# Patient Record
Sex: FEMALE | Race: WHITE | NOT HISPANIC OR LATINO | Employment: FULL TIME
[De-identification: names, ages, dates, MRNs, and addresses within clinical notes are randomized per-mention and may not be internally consistent; named-entity substitution may affect disease eponyms.]

---

## 2017-08-05 ENCOUNTER — HEALTH MAINTENANCE LETTER (OUTPATIENT)
Age: 37
End: 2017-08-05

## 2019-11-05 ENCOUNTER — HEALTH MAINTENANCE LETTER (OUTPATIENT)
Age: 39
End: 2019-11-05

## 2019-11-19 ENCOUNTER — OFFICE VISIT (OUTPATIENT)
Dept: FAMILY MEDICINE | Facility: CLINIC | Age: 39
End: 2019-11-19
Payer: COMMERCIAL

## 2019-11-19 VITALS
OXYGEN SATURATION: 97 % | BODY MASS INDEX: 38.22 KG/M2 | SYSTOLIC BLOOD PRESSURE: 144 MMHG | TEMPERATURE: 97.9 F | DIASTOLIC BLOOD PRESSURE: 86 MMHG | RESPIRATION RATE: 20 BRPM | HEART RATE: 100 BPM | HEIGHT: 70 IN | WEIGHT: 267 LBS

## 2019-11-19 DIAGNOSIS — I10 ESSENTIAL HYPERTENSION: Primary | ICD-10-CM

## 2019-11-19 PROBLEM — K90.41 GLUTEN INTOLERANCE: Status: ACTIVE | Noted: 2019-11-19

## 2019-11-19 PROBLEM — R73.03 PREDIABETES: Status: ACTIVE | Noted: 2018-12-10

## 2019-11-19 PROBLEM — J30.1 SEASONAL ALLERGIC RHINITIS DUE TO POLLEN: Status: ACTIVE | Noted: 2019-11-19

## 2019-11-19 PROCEDURE — 99203 OFFICE O/P NEW LOW 30 MIN: CPT | Performed by: NURSE PRACTITIONER

## 2019-11-19 RX ORDER — HYDROCHLOROTHIAZIDE 25 MG/1
25 TABLET ORAL DAILY
COMMUNITY
Start: 2010-09-04 | End: 2019-11-19

## 2019-11-19 RX ORDER — CHLORTHALIDONE 25 MG/1
25 TABLET ORAL DAILY
Qty: 30 TABLET | Refills: 1 | Status: SHIPPED | OUTPATIENT
Start: 2019-11-19 | End: 2020-01-02

## 2019-11-19 ASSESSMENT — MIFFLIN-ST. JEOR: SCORE: 1965.32

## 2019-11-19 ASSESSMENT — PAIN SCALES - GENERAL: PAINLEVEL: NO PAIN (0)

## 2019-11-19 NOTE — PROGRESS NOTES
Subjective     Madyson Downing is a 39 year old female who presents to clinic today for the following health issues:    HPI   Chief Complaint   Patient presents with     Medication Request     needs to change BP meds     Patient is here to discuss blood pressure medications.  She is currently on hydrochlorothiazide without issues.  She had anaphylaxis with amlodipine recently and severe GI upset with losartan.  Patient had intestinal angioedema with lisinopril in the past.  She is working on diet, exercise and weight loss to help control her blood pressure.  Blood pressure at home has been similar to blood pressure today in clinic.          Patient Active Problem List   Diagnosis     Contraceptive management     CARDIOVASCULAR SCREENING; LDL GOAL LESS THAN 160     ADHD, predominantly inattentive type     Hashimoto's thyroiditis     Essential hypertension     Prediabetes     Sleep apnea     Seasonal allergic rhinitis due to pollen     Gluten intolerance     Past Surgical History:   Procedure Laterality Date     C ANESTH, SECTION      x2     EYE SURGERY Right     Strabismus correction     HC REMOVAL GALLBLADDER       SURGICAL HISTORY OF -       eye surgery for lazy eye       Social History     Tobacco Use     Smoking status: Never Smoker     Smokeless tobacco: Never Used   Substance Use Topics     Alcohol use: Yes     Comment: Socially     Family History   Problem Relation Age of Onset     Lipids Father      Diabetes Father         Type 2     Cerebrovascular Disease Mother      C.A.D. Mother      Diabetes Sister         Type 2     Diabetes Sister         Type 2         Current Outpatient Medications   Medication Sig Dispense Refill     beclomethasone (QNASL) 80 MCG/ACT nasal aerosol INHALE 1 PUFF PER NOSTRIL BID       chlorthalidone (HYGROTON) 25 MG tablet Take 1 tablet (25 mg) by mouth daily 30 tablet 1     LEVOTHYROXINE SODIUM 88 MCG OR TABS 1 TABLET DAILY       MULTIPLE VITAMINS-CALCIUM PO Take 1  "tablet by mouth every 24 hours       Allergies   Allergen Reactions     Amlodipine Anaphylaxis     Food      PN: LW Other1: -Gluten Sensitivity     Gluten Meal Nausea and Vomiting     Losartan Nausea and GI Disturbance     BP Readings from Last 3 Encounters:   11/19/19 (!) 144/86   08/13/09 108/84    Wt Readings from Last 3 Encounters:   11/19/19 121.1 kg (267 lb)   08/13/09 116.1 kg (256 lb)                    Reviewed and updated as needed this visit by Provider  Tobacco  Allergies  Meds  Problems  Med Hx  Surg Hx  Fam Hx         Review of Systems   ROS COMP: Constitutional, HEENT, cardiovascular, pulmonary, gi and gu systems are negative, except as otherwise noted.      Objective    BP (!) 144/86   Pulse 100   Temp 97.9  F (36.6  C) (Oral)   Resp 20   Ht 1.784 m (5' 10.25\")   Wt 121.1 kg (267 lb)   LMP 11/18/2019   SpO2 97%   BMI 38.04 kg/m    Body mass index is 38.04 kg/m .  Physical Exam   GENERAL: healthy, alert and no distress  RESP: lungs clear to auscultation - no rales, rhonchi or wheezes  CV: regular rate and rhythm, normal S1 S2, no S3 or S4, no murmur, click or rub, no peripheral edema and peripheral pulses strong  MS: no gross musculoskeletal defects noted, no edema    Diagnostic Test Results:  none         Assessment & Plan     1. Essential hypertension  Patient to stop hydrochlorothiazide and will try chlorthalidone, which may better control her blood pressure than hydrochlorothiazide .  Consider adding a beta blocker if blood pressure is not controlled.  - chlorthalidone (HYGROTON) 25 MG tablet; Take 1 tablet (25 mg) by mouth daily  Dispense: 30 tablet; Refill: 1               Return in about 2 weeks (around 12/3/2019) for BP Recheck, labs.    [facility]Y [facility]ios [facility]      "

## 2019-12-03 ENCOUNTER — ALLIED HEALTH/NURSE VISIT (OUTPATIENT)
Dept: FAMILY MEDICINE | Facility: CLINIC | Age: 39
End: 2019-12-03

## 2019-12-03 VITALS — DIASTOLIC BLOOD PRESSURE: 84 MMHG | SYSTOLIC BLOOD PRESSURE: 136 MMHG

## 2019-12-03 DIAGNOSIS — Z01.30 BP CHECK: Primary | ICD-10-CM

## 2019-12-03 PROCEDURE — 99207 ZZC NO CHARGE NURSE ONLY: CPT | Performed by: NURSE PRACTITIONER

## 2019-12-03 NOTE — PROGRESS NOTES
Madyson Downing was evaluated at [facility] on December 3, 2019 at which time her blood pressure was:    BP Readings from Last 3 Encounters:   12/03/19 136/84   11/19/19 (!) 144/86   08/13/09 108/84     Pulse Readings from Last 3 Encounters:   11/19/19 100   08/13/09 80       Reviewed lifestyle modifications for blood pressure control and reduction: including making healthy food choices, managing weight, getting regular exercise, smoking cessation, reducing alcohol consumption, monitoring blood pressure regularly.     Symptoms: None    BP Goal:< 140/90 mmHg    BP Assessment:  BP at goal    Potential Reasons for BP too high: NA - Not applicable    BP Follow-Up Plan: Recheck BP in 6 months at pharmacy    Recommendation to Provider: Continue with new medication.  Patient said she has felt very good on that.     Note completed by: Thank you,  Neeta Ramos, PharmD  [facility]  [phone]

## 2020-01-02 ENCOUNTER — OFFICE VISIT (OUTPATIENT)
Dept: FAMILY MEDICINE | Facility: CLINIC | Age: 40
End: 2020-01-02
Payer: COMMERCIAL

## 2020-01-02 VITALS
BODY MASS INDEX: 38.51 KG/M2 | WEIGHT: 269 LBS | RESPIRATION RATE: 18 BRPM | HEIGHT: 70 IN | DIASTOLIC BLOOD PRESSURE: 86 MMHG | OXYGEN SATURATION: 99 % | TEMPERATURE: 98.9 F | HEART RATE: 99 BPM | SYSTOLIC BLOOD PRESSURE: 128 MMHG

## 2020-01-02 DIAGNOSIS — J30.1 SEASONAL ALLERGIC RHINITIS DUE TO POLLEN: Primary | ICD-10-CM

## 2020-01-02 DIAGNOSIS — I10 ESSENTIAL HYPERTENSION: ICD-10-CM

## 2020-01-02 LAB
ANION GAP SERPL CALCULATED.3IONS-SCNC: 7 MMOL/L (ref 3–14)
BUN SERPL-MCNC: 15 MG/DL (ref 7–30)
CALCIUM SERPL-MCNC: 9.6 MG/DL (ref 8.5–10.1)
CHLORIDE SERPL-SCNC: 101 MMOL/L (ref 94–109)
CO2 SERPL-SCNC: 28 MMOL/L (ref 20–32)
CREAT SERPL-MCNC: 0.53 MG/DL (ref 0.52–1.04)
GFR SERPL CREATININE-BSD FRML MDRD: >90 ML/MIN/{1.73_M2}
GLUCOSE SERPL-MCNC: 109 MG/DL (ref 70–99)
POTASSIUM SERPL-SCNC: 3.8 MMOL/L (ref 3.4–5.3)
SODIUM SERPL-SCNC: 136 MMOL/L (ref 133–144)

## 2020-01-02 PROCEDURE — 80048 BASIC METABOLIC PNL TOTAL CA: CPT | Performed by: NURSE PRACTITIONER

## 2020-01-02 PROCEDURE — 99213 OFFICE O/P EST LOW 20 MIN: CPT | Performed by: NURSE PRACTITIONER

## 2020-01-02 PROCEDURE — 36415 COLL VENOUS BLD VENIPUNCTURE: CPT | Performed by: NURSE PRACTITIONER

## 2020-01-02 RX ORDER — CHLORTHALIDONE 25 MG/1
25 TABLET ORAL DAILY
Qty: 90 TABLET | Refills: 3 | Status: SHIPPED | OUTPATIENT
Start: 2020-01-02 | End: 2021-01-14

## 2020-01-02 ASSESSMENT — PAIN SCALES - GENERAL: PAINLEVEL: NO PAIN (0)

## 2020-01-02 ASSESSMENT — MIFFLIN-ST. JEOR: SCORE: 1974.4

## 2020-01-02 NOTE — PROGRESS NOTES
Subjective     Madyson Downing is a 39 year old female who presents to clinic today for the following health issues:    HPI   Medication Followup of Chlorthalidone 25 mg    Taking Medication as prescribed: yes    Side Effects:  None    Medication Helping Symptoms:  yes     Patient feels good on chlorthalidone.  She denies any side effects and blood pressure has been controlled.    Patient requests refill of QNasl, as her allergist has retired.  She has chronically used medication twice daily.  She requests referral to new allergist.        Patient Active Problem List   Diagnosis     Contraceptive management     CARDIOVASCULAR SCREENING; LDL GOAL LESS THAN 160     ADHD, predominantly inattentive type     Hashimoto's thyroiditis     Essential hypertension     Prediabetes     Sleep apnea     Seasonal allergic rhinitis due to pollen     Gluten intolerance     Past Surgical History:   Procedure Laterality Date     C ANESTH, SECTION      x2     EYE SURGERY Right     Strabismus correction     HC REMOVAL GALLBLADDER       SURGICAL HISTORY OF -       eye surgery for lazy eye       Social History     Tobacco Use     Smoking status: Never Smoker     Smokeless tobacco: Never Used   Substance Use Topics     Alcohol use: Yes     Comment: Socially     Family History   Problem Relation Age of Onset     Lipids Father      Diabetes Father         Type 2     Cerebrovascular Disease Mother      C.A.D. Mother      Diabetes Sister         Type 2     Diabetes Sister         Type 2         Current Outpatient Medications   Medication Sig Dispense Refill     beclomethasone (QNASL) 80 MCG/ACT nasal aerosol Spray 1 spray into both nostrils 2 times daily 10.6 g 11     chlorthalidone (HYGROTON) 25 MG tablet Take 1 tablet (25 mg) by mouth daily 90 tablet 3     LEVOTHYROXINE SODIUM 88 MCG OR TABS 1 TABLET DAILY       MULTIPLE VITAMINS-CALCIUM PO Take 1 tablet by mouth every 24 hours       Allergies   Allergen Reactions     Amlodipine  "Anaphylaxis     Food      PN: LW Other1: -Gluten Sensitivity     Gluten Meal Nausea and Vomiting     Losartan Nausea and GI Disturbance     BP Readings from Last 3 Encounters:   01/02/20 128/86   12/03/19 136/84   11/19/19 (!) 144/86    Wt Readings from Last 3 Encounters:   01/02/20 122 kg (269 lb)   11/19/19 121.1 kg (267 lb)   08/13/09 116.1 kg (256 lb)                    Reviewed and updated as needed this visit by Provider  Tobacco  Allergies  Meds  Problems  Med Hx  Surg Hx  Fam Hx         Review of Systems   ROS COMP: Constitutional, HEENT, cardiovascular, pulmonary, gi and gu systems are negative, except as otherwise noted.      Objective    /86   Pulse 99   Temp 98.9  F (37.2  C) (Oral)   Resp 18   Ht 1.784 m (5' 10.25\")   Wt 122 kg (269 lb)   LMP 12/09/2019   SpO2 99%   BMI 38.32 kg/m    Body mass index is 38.32 kg/m .  Physical Exam   GENERAL: healthy, alert and no distress  RESP: lungs clear to auscultation - no rales, rhonchi or wheezes  CV: regular rate and rhythm, normal S1 S2, no S3 or S4, no murmur, click or rub, no peripheral edema and peripheral pulses strong  MS: no gross musculoskeletal defects noted, no edema    Diagnostic Test Results:  In process        Assessment & Plan     1. Seasonal allergic rhinitis due to pollen    - ALLERGY/ASTHMA ADULT REFERRAL  - beclomethasone (QNASL) 80 MCG/ACT nasal aerosol; Spray 1 spray into both nostrils 2 times daily  Dispense: 10.6 g; Refill: 11    2. Essential hypertension  Stable.  Continue current treatment plan and medications.   - chlorthalidone (HYGROTON) 25 MG tablet; Take 1 tablet (25 mg) by mouth daily  Dispense: 90 tablet; Refill: 3  - Basic metabolic panel           Return in about 10 months (around 10/24/2020) for Physical Exam.    NATTY Rios [facility]    "

## 2020-01-03 NOTE — RESULT ENCOUNTER NOTE
Dear Madyson,    Your recent test results are attached.      Normal kidney function and electrolytes.      If you have any questions please feel free to contact [phone] or myself via [employer].    Sincerely,  Shell Cornejo, CNP

## 2020-10-27 ENCOUNTER — VIRTUAL VISIT (OUTPATIENT)
Dept: FAMILY MEDICINE | Facility: CLINIC | Age: 40
End: 2020-10-27
Payer: COMMERCIAL

## 2020-10-27 DIAGNOSIS — B34.9 VIRAL ILLNESS: ICD-10-CM

## 2020-10-27 DIAGNOSIS — L01.00 IMPETIGO: Primary | ICD-10-CM

## 2020-10-27 PROCEDURE — 99213 OFFICE O/P EST LOW 20 MIN: CPT | Mod: TEL | Performed by: NURSE PRACTITIONER

## 2020-10-27 RX ORDER — DOXYCYCLINE 100 MG/1
100 CAPSULE ORAL 2 TIMES DAILY
Qty: 14 CAPSULE | Refills: 0 | Status: SHIPPED | OUTPATIENT
Start: 2020-10-27 | End: 2020-11-03

## 2020-10-27 NOTE — PROGRESS NOTES
"Madyson Downing is a 40 year old female who is being evaluated via a billable telephone visit.      The patient has been notified of following:     \"This telephone visit will be conducted via a call between you and your physician/provider. We have found that certain health care needs can be provided without the need for a physical exam.  This service lets us provide the care you need with a short phone conversation.  If a prescription is necessary we can send it directly to your pharmacy.  If lab work is needed we can place an order for that and you can then stop by our lab to have the test done at a later time.    Telephone visits are billed at different rates depending on your insurance coverage. During this emergency period, for some insurers they may be billed the same as an in-person visit.  Please reach out to your insurance provider with any questions.    If during the course of the call the physician/provider feels a telephone visit is not appropriate, you will not be charged for this service.\"    Patient has given verbal consent for Telephone visit?  Yes    What phone number would you like to be contacted at? [phone]    How would you like to obtain your AVS? MyChart    Subjective     Madyson Downing is a 40 year old female who presents via phone visit today for the following health issues:    HPI     Concern - Sores on Scalp.  Onset: 6 months ago  Description: back of head  Intensity: mild  Progression of Symptoms:  improving  Accompanying Signs & Symptoms: itchy worse in the evening.   Precipitating factors:        Worsened by: Dandruff shampoo made it more itchy.   Alleviating factors:        Improved by:  Cephalexin, Ketoconazole, Mupirocin   Therapies tried and outcome: Cephalexin, Ketoconazole, Mupirocin have all helped but still have sores.     Patient notes that she has had Covid-19 symptoms of cough, mild shortness of breath for the past 5 days.  She tested negative on 10/25, but continues to have " symptoms.  Both of her children have tested positive for Covid-19.  She wonders if she should be retested.             Review of Systems   Constitutional, HEENT, cardiovascular, pulmonary, gi and gu systems are negative, except as otherwise noted.       Objective          Vitals:  No vitals were obtained today due to virtual visit.    healthy, alert and no distress  PSYCH: Alert and oriented times 3; coherent speech, normal   rate and volume, able to articulate logical thoughts, able   to abstract reason, no tangential thoughts, no hallucinations   or delusions  Her affect is normal  RESP: No cough, no audible wheezing, able to talk in full sentences  Remainder of exam unable to be completed due to telephone visits            Assessment/Plan:    Assessment & Plan     Impetigo  Will try treating for MRSA.  If no improvement, patient to follow-up with dermatology.  - DERMATOLOGY ADULT REFERRAL; Future  - doxycycline hyclate (VIBRAMYCIN) 100 MG capsule; Take 1 capsule (100 mg) by mouth 2 times daily for 7 days    Viral illness  Patient to retest for Covid-19.  I have strong suspicion for false negative on previous test.  - Symptomatic COVID-19 Virus (Coronavirus) by PCR; Future                Return in about 3 months (around 1/27/2021) for Physical Exam.    NATTY Rios [facility]    Phone call duration:  9 minutes

## 2020-10-29 DIAGNOSIS — B34.9 VIRAL ILLNESS: ICD-10-CM

## 2020-10-29 PROCEDURE — U0003 INFECTIOUS AGENT DETECTION BY NUCLEIC ACID (DNA OR RNA); SEVERE ACUTE RESPIRATORY SYNDROME CORONAVIRUS 2 (SARS-COV-2) (CORONAVIRUS DISEASE [COVID-19]), AMPLIFIED PROBE TECHNIQUE, MAKING USE OF HIGH THROUGHPUT TECHNOLOGIES AS DESCRIBED BY CMS-2020-01-R: HCPCS | Performed by: NURSE PRACTITIONER

## 2020-10-30 LAB
SARS-COV-2 RNA SPEC QL NAA+PROBE: NOT DETECTED
SPECIMEN SOURCE: NORMAL

## 2020-11-02 ENCOUNTER — VIRTUAL VISIT (OUTPATIENT)
Dept: FAMILY MEDICINE | Facility: OTHER | Age: 40
End: 2020-11-02

## 2020-11-02 ENCOUNTER — TRANSFERRED RECORDS (OUTPATIENT)
Dept: HEALTH INFORMATION MANAGEMENT | Facility: CLINIC | Age: 40
End: 2020-11-02

## 2020-11-02 NOTE — RESULT ENCOUNTER NOTE
Dear Madyson,    Your recent test results are attached.      No Covid-19 detected.    If you have any questions please feel free to contact [phone] or myself via [employer].    Sincerely,  Shell Cornejo, CNP

## 2020-11-02 NOTE — PROGRESS NOTES
"Date: 2020 08:16:35  Clinician: Jessenia Penny  Clinician NPI: [id number]  Patient: Madyson Downing  Patient : 1980  Patient Address: [address] [place] [address]  Patient Phone: [phone]  Visit Protocol: URI  Patient Summary:  Madyson is a 40 year old ( : 1980 ) female who initiated a [facility] Visit for COVID-19 (Coronavirus) evaluation and screening. When asked the question \"Please sign me up to receive news, health information and promotions from [facility].\", Madyson responded \"Yes\".    Madyson states her symptoms started suddenly 10-13 days ago. After her symptoms started, they improved and then got worse again.   Her symptoms consist of a cough, myalgia, malaise, ageusia, diarrhea, and rhinitis.   Symptom details     Nasal secretions: The color of her mucus is white.    Cough: Madyson coughs a few times an hour and her cough is not more bothersome at night. Phlegm does not come into her throat when she coughs. She does not believe her cough is caused by post-nasal drip.      Madyson denies having ear pain, headache, wheezing, fever, nasal congestion, nausea, facial pain or pressure, chills, sore throat, teeth pain, anosmia, and vomiting. She also denies having recent facial or sinus surgery in the past 60 days. She is not experiencing dyspnea.   Precipitating events  She has not recently been exposed to someone with influenza. Madyson has been in close contact with the following high risk individuals: people with asthma, heart disease or diabetes.   Pertinent COVID-19 (Coronavirus) information  Madyson does not work or volunteer as healthcare worker or a . In the past 14 days, Madyson has not worked or volunteered at a healthcare facility or group living setting.   In the past 14 days, she also has not lived in a congregate living setting.   Madyson has had a close contact with a laboratory-confirmed COVID-19 patient within 14 days of symptom onset. She was not exposed at " her work. Date Madyson was exposed to the laboratory-confirmed COVID-19 patient: 10/20/2020   Additional information about contact with COVID-19 (Coronavirus) patient as reported by the patient (free text): Both my children tested positive for COVID-19, one on 10/19 and the other 10/24.  I tested negative on 10/24 and 10/29.  However, I wanted to connect with someone regarding intermittent chest pain that is worse at night when I am lying down in bed.    Since December 2019, Madyson has not been diagnosed with lab-confirmed COVID-19 test and has had upper respiratory infection (URI) or influenza-like illness.      Date(s) of previous URI or influenza-like illness (free-text): 10/22/2020 through the present     Symptoms Madyson experienced during previous URI or influenza-like illness as reported by the patient (free-text): Dry cough, sore throat, muscle aches, fatigue, dry eyes, burning face, and intermittent chest pain to the left of my sternum that's worse at night and when I take a deep breath.        Pertinent medical history  Madyson has taken an antibiotic medication in the past month. Antibiotic details as reported by the patient (free text): Doxycycline---skin infection on scalp   Madyson does not get yeast infections when she takes antibiotics.   Madyson needs a return to work/school note.   Weight: 240 lbs   Madyson does not smoke or use smokeless tobacco.   She denies pregnancy and denies breastfeeding. She has menstruated in the past month.   Weight: 240 lbs  A synchronous phone visit was initiated by the provider for the following reason: chest pain    MEDICATIONS: doxycycline hyclate oral, chlorthalidone oral, levothyroxine oral, ALLERGIES: lisinopril, losartan, amlodipine  Clinician Response:  Dear Madyson,  I am sorry you are not feeling well. To determine the most appropriate care for you, I would like you to be seen in person to further discuss your health history and symptoms.  You will not be charged for  this [facility] Visit. Thank you for trusting us with your care.  COVID-19 (Coronavirus) General Information  Because there is currently no vaccine to prevent infection, the best way to protect yourself is to avoid being exposed to this virus. Common symptoms of COVID-19 include but are not limited to fever, cough, and shortness of breath. These symptoms appear 2-14 days after you are exposed to the virus that causes COVID-19. Click here for more information from the CDC on how to protect yourself.  If you are sick with COVID-19 or suspect you are infected with the virus that causes COVID-19, follow the steps here from the CDC to help prevent the disease from spreading to people in your home and community.  Click here for general information from the CDC on testing.  If you develop any of these emergency warning signs for COVID-19, get medical attention immediately:     Trouble breathing    Persistent pain or pressure in the chest    New confusion or inability to arouse    Bluish lips or face      Call your doctor or clinic before going in. Call 911 if you have a medical emergency and notify the  you have or think you may have COVID-19.  For more detailed and up to date information on COVID-19 (Coronavirus), please visit the CDC website.   Diagnosis: Refer for additional evaluation  Diagnosis ICD: R69  Triage Notes: I reviewed the patient's history, verified their identity, and explained the [facility] Visit process.    Chest pain for 3 days  Synchronous Triage: phone, status: completed, duration: 148 seconds

## 2020-11-12 NOTE — PROGRESS NOTES
"Subjective     Madyson Downing is a 40 year old female who presents to clinic today for the following health issues:    HPI         ED/UC Followup:    Facility:  [facility]   Date of visit: 11/02/2020  Reason for visit: chest pain and burning in chest  Current Status: doing much better     Patient took Pepcid AC with resolution of symptoms.  Patient notes mild cough following infection.  She denies any further chest pain.  She feels that she is near baseline at this time and would like to return to exercise.  She is concerned that  provider noted EKG was abnormal, but let her go due to symptoms improving with antacids.  Notes state that presentation was not normal for ACS.  Patient notes that she had been exercising regularly prior to illness without chest pain, significant shortness of breath.        Review of Systems   Constitutional, HEENT, cardiovascular, pulmonary, gi and gu systems are negative, except as otherwise noted.      Objective    /66   Pulse 72   Temp 97.6  F (36.4  C) (Oral)   Resp 20   Ht 1.784 m (5' 10.25\")   Wt 110 kg (242 lb 9.6 oz)   SpO2 99%   BMI 34.56 kg/m    Body mass index is 34.56 kg/m .  Physical Exam   GENERAL: healthy, alert and no distress  RESP: lungs clear to auscultation - no rales, rhonchi or wheezes  CV: regular rate and rhythm, normal S1 S2, no S3 or S4, no murmur, click or rub, no peripheral edema and peripheral pulses strong  MS: no gross musculoskeletal defects noted, no edema            Assessment & Plan     Chest pain, unspecified type  Resolved.  I will obtain EKG from [facility] and review.  Likely related to coughing, GERD.    Gastroesophageal reflux disease without esophagitis  Resolved.  Patient using Pepcid AC intermittently.                  Return in about 6 months (around 5/13/2021) for Physical Exam.    NATTY Rios CNP  [facility]    "

## 2020-11-13 ENCOUNTER — OFFICE VISIT (OUTPATIENT)
Dept: FAMILY MEDICINE | Facility: CLINIC | Age: 40
End: 2020-11-13
Payer: COMMERCIAL

## 2020-11-13 ENCOUNTER — TELEPHONE (OUTPATIENT)
Dept: INTERNAL MEDICINE | Facility: CLINIC | Age: 40
End: 2020-11-13

## 2020-11-13 VITALS
RESPIRATION RATE: 20 BRPM | SYSTOLIC BLOOD PRESSURE: 136 MMHG | HEART RATE: 72 BPM | DIASTOLIC BLOOD PRESSURE: 66 MMHG | BODY MASS INDEX: 34.73 KG/M2 | TEMPERATURE: 97.6 F | OXYGEN SATURATION: 99 % | HEIGHT: 70 IN | WEIGHT: 242.6 LBS

## 2020-11-13 DIAGNOSIS — K21.9 GASTROESOPHAGEAL REFLUX DISEASE WITHOUT ESOPHAGITIS: ICD-10-CM

## 2020-11-13 DIAGNOSIS — R07.9 CHEST PAIN, UNSPECIFIED TYPE: Primary | ICD-10-CM

## 2020-11-13 PROCEDURE — 99212 OFFICE O/P EST SF 10 MIN: CPT | Performed by: NURSE PRACTITIONER

## 2020-11-13 ASSESSMENT — MIFFLIN-ST. JEOR: SCORE: 1849.65

## 2020-11-13 NOTE — TELEPHONE ENCOUNTER
Pt called and left message on RN hotline. States she is returning Shell's call and was just in our office. No notes found. Pt can be reached at [phone]. Please advise.    Mary Toth RN  [facility]

## 2020-11-13 NOTE — TELEPHONE ENCOUNTER
Called and spoke to patient. Unable to get EKG results until patient sign MEAGHAN. Patient will come in Monday to sign MEAGHAN.  Estela Mcmillan CMA

## 2020-11-22 ENCOUNTER — HEALTH MAINTENANCE LETTER (OUTPATIENT)
Age: 40
End: 2020-11-22

## 2021-01-02 ENCOUNTER — MYC MEDICAL ADVICE (OUTPATIENT)
Dept: FAMILY MEDICINE | Facility: CLINIC | Age: 41
End: 2021-01-02

## 2021-01-02 DIAGNOSIS — E06.3 HASHIMOTO'S THYROIDITIS: Primary | ICD-10-CM

## 2021-01-05 ENCOUNTER — TELEPHONE (OUTPATIENT)
Dept: FAMILY MEDICINE | Facility: CLINIC | Age: 41
End: 2021-01-05

## 2021-01-05 DIAGNOSIS — E06.3 HASHIMOTO'S THYROIDITIS: ICD-10-CM

## 2021-01-05 RX ORDER — LEVOTHYROXINE SODIUM 88 UG/1
88 TABLET ORAL DAILY
Qty: 90 TABLET | Refills: 1 | Status: SHIPPED | OUTPATIENT
Start: 2021-01-05 | End: 2021-06-29

## 2021-01-05 NOTE — TELEPHONE ENCOUNTER
Reason for Call:  Other prescription    Detailed comments: patient states has one tablet left of Levothyroxine - 88mcg    Phone Number Patient can be reached at: Cell number on file:    Telephone Information:   Mobile [phone]       Best Time: anytime    Can we leave a detailed message on this number? YES    Call taken on 1/5/2021 at 10:13 AM by Natasha Stafford

## 2021-01-10 DIAGNOSIS — J30.1 SEASONAL ALLERGIC RHINITIS DUE TO POLLEN: ICD-10-CM

## 2021-01-11 RX ORDER — BECLOMETHASONE DIPROPIONATE 80 UG/1
AEROSOL, METERED NASAL
Qty: 10.6 G | Refills: 11 | Status: SHIPPED | OUTPATIENT
Start: 2021-01-11

## 2021-01-14 DIAGNOSIS — I10 ESSENTIAL HYPERTENSION: ICD-10-CM

## 2021-01-14 RX ORDER — CHLORTHALIDONE 25 MG/1
TABLET ORAL
Qty: 90 TABLET | Refills: 1 | Status: SHIPPED | OUTPATIENT
Start: 2021-01-14 | End: 2021-07-12

## 2021-06-29 DIAGNOSIS — E06.3 HASHIMOTO'S THYROIDITIS: ICD-10-CM

## 2021-06-29 RX ORDER — LEVOTHYROXINE SODIUM 88 UG/1
88 TABLET ORAL DAILY
Qty: 90 TABLET | Refills: 0 | Status: SHIPPED | OUTPATIENT
Start: 2021-06-29 | End: 2021-10-04

## 2021-07-10 DIAGNOSIS — I10 ESSENTIAL HYPERTENSION: ICD-10-CM

## 2021-07-12 RX ORDER — CHLORTHALIDONE 25 MG/1
25 TABLET ORAL DAILY
Qty: 90 TABLET | Refills: 0 | Status: SHIPPED | OUTPATIENT
Start: 2021-07-12 | End: 2021-10-11

## 2021-07-12 NOTE — TELEPHONE ENCOUNTER
"Routing refill request to provider for review/approval because:  Labs not current:  CR, K+, NA      Requested Prescriptions   Pending Prescriptions Disp Refills     chlorthalidone (HYGROTON) 25 MG tablet [Pharmacy Med Name: CHLORTHALIDONE 25MG TABLETS] 90 tablet 1     Sig: TAKE 1 TABLET(25 MG) BY MOUTH DAILY       Diuretics (Including Combos) Protocol Failed - 7/10/2021 10:05 AM        Failed - Normal serum creatinine on file in past 12 months     Recent Labs   Lab Test 01/02/20  1010   CR 0.53              Failed - Normal serum potassium on file in past 12 months     Recent Labs   Lab Test 01/02/20  1010   POTASSIUM 3.8                    Failed - Normal serum sodium on file in past 12 months     Recent Labs   Lab Test 01/02/20  1010                 Passed - Blood pressure under 140/90 in past 12 months     BP Readings from Last 3 Encounters:   11/13/20 136/66   01/02/20 128/86   12/03/19 136/84                 Passed - Recent (12 mo) or future (30 days) visit within the authorizing provider's specialty     Patient has had an office visit with the authorizing provider or a provider within the authorizing providers department within the previous 12 mos or has a future within next 30 days. See \"Patient Info\" tab in inbasket, or \"Choose Columns\" in Meds & Orders section of the refill encounter.              Passed - Medication is active on med list        Passed - Patient is age 18 or older        Passed - No active pregancy on record        Passed - No positive pregnancy test in past 12 months           Fatmata Castillo RN on 7/12/2021 at 2:48 PM    "

## 2021-09-19 ENCOUNTER — HEALTH MAINTENANCE LETTER (OUTPATIENT)
Age: 41
End: 2021-09-19

## 2021-09-30 DIAGNOSIS — E06.3 HASHIMOTO'S THYROIDITIS: ICD-10-CM

## 2021-10-04 RX ORDER — LEVOTHYROXINE SODIUM 88 UG/1
TABLET ORAL
Qty: 90 TABLET | Refills: 0 | Status: SHIPPED | OUTPATIENT
Start: 2021-10-04 | End: 2021-10-25

## 2021-10-04 NOTE — TELEPHONE ENCOUNTER
Pt scheduled exam for 10/25/21. Refill sent to pharmacy.    Mary Toth RN  [facility]

## 2021-10-09 DIAGNOSIS — I10 ESSENTIAL HYPERTENSION: ICD-10-CM

## 2021-10-11 RX ORDER — CHLORTHALIDONE 25 MG/1
25 TABLET ORAL DAILY
Qty: 30 TABLET | Refills: 0 | Status: SHIPPED | OUTPATIENT
Start: 2021-10-11 | End: 2021-10-25

## 2021-10-21 ASSESSMENT — ENCOUNTER SYMPTOMS
MYALGIAS: 1
ARTHRALGIAS: 1
SORE THROAT: 1
HEARTBURN: 1
COUGH: 1
BREAST MASS: 0

## 2021-10-22 ENCOUNTER — LAB (OUTPATIENT)
Dept: FAMILY MEDICINE | Facility: CLINIC | Age: 41
End: 2021-10-22
Attending: NURSE PRACTITIONER
Payer: COMMERCIAL

## 2021-10-22 DIAGNOSIS — R05.9 COUGH: ICD-10-CM

## 2021-10-22 PROCEDURE — U0003 INFECTIOUS AGENT DETECTION BY NUCLEIC ACID (DNA OR RNA); SEVERE ACUTE RESPIRATORY SYNDROME CORONAVIRUS 2 (SARS-COV-2) (CORONAVIRUS DISEASE [COVID-19]), AMPLIFIED PROBE TECHNIQUE, MAKING USE OF HIGH THROUGHPUT TECHNOLOGIES AS DESCRIBED BY CMS-2020-01-R: HCPCS

## 2021-10-22 PROCEDURE — U0005 INFEC AGEN DETEC AMPLI PROBE: HCPCS

## 2021-10-23 LAB — SARS-COV-2 RNA RESP QL NAA+PROBE: NEGATIVE

## 2021-10-25 ENCOUNTER — OFFICE VISIT (OUTPATIENT)
Dept: FAMILY MEDICINE | Facility: CLINIC | Age: 41
End: 2021-10-25
Payer: COMMERCIAL

## 2021-10-25 VITALS
BODY MASS INDEX: 33.67 KG/M2 | WEIGHT: 235.2 LBS | HEART RATE: 87 BPM | DIASTOLIC BLOOD PRESSURE: 85 MMHG | SYSTOLIC BLOOD PRESSURE: 131 MMHG | OXYGEN SATURATION: 98 % | TEMPERATURE: 97.6 F | HEIGHT: 70 IN

## 2021-10-25 DIAGNOSIS — Z12.4 SCREENING FOR CERVICAL CANCER: ICD-10-CM

## 2021-10-25 DIAGNOSIS — Z11.59 NEED FOR HEPATITIS C SCREENING TEST: ICD-10-CM

## 2021-10-25 DIAGNOSIS — R73.03 PREDIABETES: ICD-10-CM

## 2021-10-25 DIAGNOSIS — E06.3 HASHIMOTO'S THYROIDITIS: ICD-10-CM

## 2021-10-25 DIAGNOSIS — J30.1 SEASONAL ALLERGIC RHINITIS DUE TO POLLEN: ICD-10-CM

## 2021-10-25 DIAGNOSIS — Z11.4 SCREENING FOR HIV (HUMAN IMMUNODEFICIENCY VIRUS): ICD-10-CM

## 2021-10-25 DIAGNOSIS — I10 ESSENTIAL HYPERTENSION: ICD-10-CM

## 2021-10-25 DIAGNOSIS — M25.50 MULTIPLE JOINT PAIN: ICD-10-CM

## 2021-10-25 DIAGNOSIS — N89.8 VAGINAL DISCHARGE: ICD-10-CM

## 2021-10-25 DIAGNOSIS — Z00.00 ROUTINE HISTORY AND PHYSICAL EXAMINATION OF ADULT: Primary | ICD-10-CM

## 2021-10-25 LAB
BASOPHILS # BLD AUTO: 0 10E3/UL (ref 0–0.2)
BASOPHILS NFR BLD AUTO: 1 %
CLUE CELLS: ABNORMAL
EOSINOPHIL # BLD AUTO: 0.1 10E3/UL (ref 0–0.7)
EOSINOPHIL NFR BLD AUTO: 2 %
ERYTHROCYTE [DISTWIDTH] IN BLOOD BY AUTOMATED COUNT: 12 % (ref 10–15)
HBA1C MFR BLD: 5.3 % (ref 0–5.6)
HCT VFR BLD AUTO: 43.3 % (ref 35–47)
HGB BLD-MCNC: 15 G/DL (ref 11.7–15.7)
LYMPHOCYTES # BLD AUTO: 2.2 10E3/UL (ref 0.8–5.3)
LYMPHOCYTES NFR BLD AUTO: 32 %
MCH RBC QN AUTO: 30.5 PG (ref 26.5–33)
MCHC RBC AUTO-ENTMCNC: 34.6 G/DL (ref 31.5–36.5)
MCV RBC AUTO: 88 FL (ref 78–100)
MONOCYTES # BLD AUTO: 0.8 10E3/UL (ref 0–1.3)
MONOCYTES NFR BLD AUTO: 12 %
NEUTROPHILS # BLD AUTO: 3.6 10E3/UL (ref 1.6–8.3)
NEUTROPHILS NFR BLD AUTO: 53 %
PLATELET # BLD AUTO: 289 10E3/UL (ref 150–450)
RBC # BLD AUTO: 4.92 10E6/UL (ref 3.8–5.2)
TRICHOMONAS, WET PREP: ABNORMAL
WBC # BLD AUTO: 6.8 10E3/UL (ref 4–11)
WBC'S/HIGH POWER FIELD, WET PREP: ABNORMAL
YEAST, WET PREP: ABNORMAL

## 2021-10-25 PROCEDURE — 85025 COMPLETE CBC W/AUTO DIFF WBC: CPT | Performed by: NURSE PRACTITIONER

## 2021-10-25 PROCEDURE — 80048 BASIC METABOLIC PNL TOTAL CA: CPT | Performed by: NURSE PRACTITIONER

## 2021-10-25 PROCEDURE — 87389 HIV-1 AG W/HIV-1&-2 AB AG IA: CPT | Performed by: NURSE PRACTITIONER

## 2021-10-25 PROCEDURE — 90714 TD VACC NO PRESV 7 YRS+ IM: CPT | Performed by: NURSE PRACTITIONER

## 2021-10-25 PROCEDURE — 90471 IMMUNIZATION ADMIN: CPT | Performed by: NURSE PRACTITIONER

## 2021-10-25 PROCEDURE — 86803 HEPATITIS C AB TEST: CPT | Performed by: NURSE PRACTITIONER

## 2021-10-25 PROCEDURE — 83036 HEMOGLOBIN GLYCOSYLATED A1C: CPT | Performed by: NURSE PRACTITIONER

## 2021-10-25 PROCEDURE — 99396 PREV VISIT EST AGE 40-64: CPT | Mod: 25 | Performed by: NURSE PRACTITIONER

## 2021-10-25 PROCEDURE — 84443 ASSAY THYROID STIM HORMONE: CPT | Performed by: NURSE PRACTITIONER

## 2021-10-25 PROCEDURE — 87210 SMEAR WET MOUNT SALINE/INK: CPT | Performed by: NURSE PRACTITIONER

## 2021-10-25 PROCEDURE — 36415 COLL VENOUS BLD VENIPUNCTURE: CPT | Performed by: NURSE PRACTITIONER

## 2021-10-25 RX ORDER — CHLORTHALIDONE 25 MG/1
25 TABLET ORAL DAILY
Qty: 90 TABLET | Refills: 3 | Status: SHIPPED | OUTPATIENT
Start: 2021-10-25

## 2021-10-25 RX ORDER — LEVOTHYROXINE SODIUM 88 UG/1
88 TABLET ORAL DAILY
Qty: 90 TABLET | Refills: 3 | Status: SHIPPED | OUTPATIENT
Start: 2021-10-25

## 2021-10-25 ASSESSMENT — PAIN SCALES - GENERAL: PAINLEVEL: SEVERE PAIN (7)

## 2021-10-25 ASSESSMENT — ENCOUNTER SYMPTOMS
HEARTBURN: 1
MYALGIAS: 1
ARTHRALGIAS: 1
BREAST MASS: 0
SORE THROAT: 1
COUGH: 1

## 2021-10-25 ASSESSMENT — MIFFLIN-ST. JEOR: SCORE: 1799.62

## 2021-10-25 NOTE — RESULT ENCOUNTER NOTE
Dear Madyson,    Your recent test results are attached.      No vagina infection.  No diabetes.    If you have any questions please feel free to contact [phone] or myself via [employer].    Sincerely,  Shell Cornejo, CNP
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
## 2021-10-25 NOTE — RESULT ENCOUNTER NOTE
Dear Madyson,    Your recent test results are attached.      No Covid-19 detected.    If you have any questions please feel free to contact [phone] or myself via [employer].    Sincerely,  Shell Cornejo, CNP

## 2021-10-25 NOTE — RESULT ENCOUNTER NOTE
Dear Madyson,    Your recent test results are attached.      No anemia.    If you have any questions please feel free to contact [phone] or myself via [employer].    Sincerely,  Shell Cornejo, CNP

## 2021-10-26 LAB
ANION GAP SERPL CALCULATED.3IONS-SCNC: 11 MMOL/L (ref 3–14)
BUN SERPL-MCNC: 13 MG/DL (ref 7–30)
CALCIUM SERPL-MCNC: 8.7 MG/DL (ref 8.5–10.1)
CHLORIDE BLD-SCNC: 99 MMOL/L (ref 94–109)
CO2 SERPL-SCNC: 25 MMOL/L (ref 20–32)
CREAT SERPL-MCNC: 0.55 MG/DL (ref 0.52–1.04)
GFR SERPL CREATININE-BSD FRML MDRD: >90 ML/MIN/1.73M2
GLUCOSE BLD-MCNC: 109 MG/DL (ref 70–99)
HCV AB SERPL QL IA: NONREACTIVE
HIV 1+2 AB+HIV1 P24 AG SERPL QL IA: NONREACTIVE
POTASSIUM BLD-SCNC: 2.9 MMOL/L (ref 3.4–5.3)
SODIUM SERPL-SCNC: 135 MMOL/L (ref 133–144)
TSH SERPL DL<=0.005 MIU/L-ACNC: 1.42 MU/L (ref 0.4–4)

## 2021-10-26 RX ORDER — POTASSIUM CHLORIDE 1500 MG/1
20 TABLET, EXTENDED RELEASE ORAL DAILY
Qty: 5 TABLET | Refills: 0 | Status: SHIPPED | OUTPATIENT
Start: 2021-10-26 | End: 2021-10-31

## 2021-10-26 NOTE — RESULT ENCOUNTER NOTE
Dear Madyson,    Your recent test results are attached.      Normal thyroid.  Your potassium is low.  This is likely related to your blood pressure med and maybe decreased intake with recent reflux.  I've sent potassium chloride for you to take 1 tab daily x 5 days to your pharmacy.  I've placed an order for lab recheck for you.  Please schedule a lab only appointment in 2 weeks.    If you have any questions please feel free to contact [phone] or myself via [email or web address].    Sincerely,  Shell Cornejo, CNP

## 2021-10-26 NOTE — RESULT ENCOUNTER NOTE
Dear Madyson,    Your recent test results are attached.      No Hepatitis C.  No HIV.    If you have any questions please feel free to contact [phone] or myself via [employer].    Sincerely,  Shell Cornejo, CNP

## 2021-10-28 DIAGNOSIS — I10 ESSENTIAL HYPERTENSION: ICD-10-CM

## 2021-10-31 NOTE — TELEPHONE ENCOUNTER
Routing refill request to provider for review/approval because:  Labs out of range:    Potassium   Date Value Ref Range Status   10/25/2021 2.9 (L) 3.4 - 5.3 mmol/L Final   01/02/2020 3.8 3.4 - 5.3 mmol/L Final     Was prescribed x5 days

## 2021-11-01 RX ORDER — POTASSIUM CHLORIDE 1500 MG/1
TABLET, EXTENDED RELEASE ORAL
Qty: 5 TABLET | Refills: 0 | OUTPATIENT
Start: 2021-11-01

## 2021-11-03 ENCOUNTER — LAB (OUTPATIENT)
Dept: LAB | Facility: CLINIC | Age: 41
End: 2021-11-03
Payer: COMMERCIAL

## 2021-11-03 DIAGNOSIS — J02.0 STREPTOCOCCAL SORE THROAT: ICD-10-CM

## 2021-11-03 DIAGNOSIS — I10 ESSENTIAL HYPERTENSION: ICD-10-CM

## 2021-11-03 LAB
ANION GAP SERPL CALCULATED.3IONS-SCNC: 9 MMOL/L (ref 5–18)
BUN SERPL-MCNC: 10 MG/DL (ref 8–22)
CALCIUM SERPL-MCNC: 9.3 MG/DL (ref 8.5–10.5)
CHLORIDE BLD-SCNC: 105 MMOL/L (ref 98–107)
CO2 SERPL-SCNC: 28 MMOL/L (ref 22–31)
CREAT SERPL-MCNC: 0.63 MG/DL (ref 0.6–1.1)
GFR SERPL CREATININE-BSD FRML MDRD: >90 ML/MIN/1.73M2
GLUCOSE BLD-MCNC: 101 MG/DL (ref 70–125)
POTASSIUM BLD-SCNC: 3.6 MMOL/L (ref 3.5–5)
SODIUM SERPL-SCNC: 142 MMOL/L (ref 136–145)

## 2021-11-03 PROCEDURE — 80048 BASIC METABOLIC PNL TOTAL CA: CPT

## 2021-11-03 PROCEDURE — 99000 SPECIMEN HANDLING OFFICE-LAB: CPT

## 2021-11-03 PROCEDURE — 86060 ANTISTREPTOLYSIN O TITER: CPT | Mod: 90

## 2021-11-03 PROCEDURE — 36415 COLL VENOUS BLD VENIPUNCTURE: CPT

## 2021-11-03 PROCEDURE — 86215 DEOXYRIBONUCLEASE ANTIBODY: CPT | Mod: 90

## 2021-11-04 NOTE — RESULT ENCOUNTER NOTE
Dear Madyson,    Your recent test results are attached.      Normal kidney function and electrolytes.      If you have any questions please feel free to contact [phone] or myself via [employer].    Sincerely,  Shell Cornejo, CNP

## 2021-11-05 ENCOUNTER — ANCILLARY PROCEDURE (OUTPATIENT)
Dept: GENERAL RADIOLOGY | Facility: CLINIC | Age: 41
End: 2021-11-05
Attending: NURSE PRACTITIONER
Payer: COMMERCIAL

## 2021-11-05 DIAGNOSIS — R07.89 CHEST WALL PAIN: ICD-10-CM

## 2021-11-05 LAB
ASO AB SERPL-ACNC: 71 IU/ML
STREP DNASE B SER-ACNC: 86 U/ML

## 2021-11-05 PROCEDURE — 71046 X-RAY EXAM CHEST 2 VIEWS: CPT | Performed by: RADIOLOGY

## 2021-11-05 NOTE — RESULT ENCOUNTER NOTE
Dear Madsyon,    Your recent test results are attached.      Negative antibodies.  How are you feeling Madyson?    If you have any questions please feel free to contact [phone] or myself via [employer].    Sincerely,  Shell Cornejo, CNP

## 2021-11-08 ENCOUNTER — LAB (OUTPATIENT)
Dept: LAB | Facility: CLINIC | Age: 41
End: 2021-11-08
Payer: COMMERCIAL

## 2021-11-08 DIAGNOSIS — J02.9 ACUTE PHARYNGITIS, UNSPECIFIED ETIOLOGY: ICD-10-CM

## 2021-11-08 DIAGNOSIS — M25.50 MULTIPLE JOINT PAIN: ICD-10-CM

## 2021-11-08 LAB
C REACTIVE PROTEIN LHE: <0.1 MG/DL (ref 0–0.8)
ERYTHROCYTE [SEDIMENTATION RATE] IN BLOOD BY WESTERGREN METHOD: 12 MM/HR (ref 0–20)
MONOCYTES NFR BLD AUTO: NEGATIVE %

## 2021-11-08 PROCEDURE — 86141 C-REACTIVE PROTEIN HS: CPT

## 2021-11-08 PROCEDURE — 85652 RBC SED RATE AUTOMATED: CPT

## 2021-11-08 PROCEDURE — 36415 COLL VENOUS BLD VENIPUNCTURE: CPT

## 2021-11-08 PROCEDURE — 86308 HETEROPHILE ANTIBODY SCREEN: CPT

## 2021-11-10 ENCOUNTER — E-VISIT (OUTPATIENT)
Dept: FAMILY MEDICINE | Facility: CLINIC | Age: 41
End: 2021-11-10
Payer: COMMERCIAL

## 2021-11-10 DIAGNOSIS — R05.9 COUGH: ICD-10-CM

## 2021-11-10 DIAGNOSIS — R07.89 CHEST WALL PAIN: Primary | ICD-10-CM

## 2021-11-10 PROCEDURE — 99422 OL DIG E/M SVC 11-20 MIN: CPT | Performed by: NURSE PRACTITIONER

## 2021-11-15 ENCOUNTER — ANCILLARY PROCEDURE (OUTPATIENT)
Dept: CT IMAGING | Facility: CLINIC | Age: 41
End: 2021-11-15
Attending: NURSE PRACTITIONER
Payer: COMMERCIAL

## 2021-11-15 DIAGNOSIS — R05.9 COUGH: ICD-10-CM

## 2021-11-15 DIAGNOSIS — R07.89 CHEST WALL PAIN: ICD-10-CM

## 2021-11-15 PROCEDURE — 71260 CT THORAX DX C+: CPT | Mod: TC | Performed by: RADIOLOGY

## 2021-11-15 RX ORDER — IOPAMIDOL 755 MG/ML
500 INJECTION, SOLUTION INTRAVASCULAR ONCE
Status: COMPLETED | OUTPATIENT
Start: 2021-11-15 | End: 2021-11-15

## 2021-11-15 RX ADMIN — IOPAMIDOL 100 ML: 755 INJECTION, SOLUTION INTRAVASCULAR at 12:52

## 2021-11-15 RX ADMIN — Medication 69 ML: at 12:52

## 2021-11-15 NOTE — RESULT ENCOUNTER NOTE
Dear Madyson,    Your recent test results are attached.      You signs of blood clots or pneumonia on Chest CT.  Can you update me with a [facility] message on how you're feeling.    If you have any questions please feel free to contact [phone] or myself via [employer].    Sincerely,  Shell Cornejo, CNP

## 2021-11-17 ENCOUNTER — E-VISIT (OUTPATIENT)
Dept: FAMILY MEDICINE | Facility: CLINIC | Age: 41
End: 2021-11-17
Payer: COMMERCIAL

## 2021-11-17 DIAGNOSIS — R05.9 COUGH: ICD-10-CM

## 2021-11-17 DIAGNOSIS — R19.7 DIARRHEA, UNSPECIFIED TYPE: Primary | ICD-10-CM

## 2021-11-17 PROCEDURE — 99207 PR NON-BILLABLE SERV PER CHARTING: CPT | Performed by: NURSE PRACTITIONER

## 2021-11-23 ENCOUNTER — APPOINTMENT (OUTPATIENT)
Dept: LAB | Facility: CLINIC | Age: 41
End: 2021-11-23
Payer: COMMERCIAL

## 2021-11-24 ENCOUNTER — LAB (OUTPATIENT)
Dept: LAB | Facility: CLINIC | Age: 41
End: 2021-11-24
Payer: COMMERCIAL

## 2021-11-24 DIAGNOSIS — R19.7 DIARRHEA, UNSPECIFIED TYPE: ICD-10-CM

## 2021-11-24 LAB — C DIFF TOX B STL QL: NEGATIVE

## 2021-11-24 PROCEDURE — 87506 IADNA-DNA/RNA PROBE TQ 6-11: CPT

## 2021-11-24 PROCEDURE — 87493 C DIFF AMPLIFIED PROBE: CPT | Mod: 59

## 2021-11-24 PROCEDURE — 87252 VIRUS INOCULATION TISSUE: CPT | Mod: 90

## 2021-11-24 PROCEDURE — 99000 SPECIMEN HANDLING OFFICE-LAB: CPT

## 2021-11-26 NOTE — RESULT ENCOUNTER NOTE
Dear Madyson,    Your recent test results are attached.      No C. Diff noted.  No common viral or bacterial infection noted.  Other stool tests remain in progress.    If you have any questions please feel free to contact [phone] or myself via [employer].    Sincerely,  Shell Cornejo, CNP

## 2022-01-04 NOTE — TELEPHONE ENCOUNTER
Routing refill request to provider for review/approval because:  Labs out of range:  TSH    No results found for: TSH          Pending Prescriptions:                       Disp   Refills    levothyroxine (SYNTHROID/LEVOTHROID) 88 MC*90 tab*1        Sig: TAKE 1 TABLET(88 MCG) BY MOUTH DAILY        Manolo Reynolds RN          
Full time

## 2022-11-20 ENCOUNTER — HEALTH MAINTENANCE LETTER (OUTPATIENT)
Age: 42
End: 2022-11-20

## 2022-11-20 DIAGNOSIS — E06.3 HASHIMOTO'S THYROIDITIS: ICD-10-CM

## 2022-11-21 DIAGNOSIS — I10 ESSENTIAL HYPERTENSION: ICD-10-CM

## 2022-11-21 RX ORDER — CHLORTHALIDONE 25 MG/1
TABLET ORAL
OUTPATIENT
Start: 2022-11-21

## 2022-11-21 RX ORDER — LEVOTHYROXINE SODIUM 88 UG/1
TABLET ORAL
OUTPATIENT
Start: 2022-11-21

## 2022-12-24 ENCOUNTER — HEALTH MAINTENANCE LETTER (OUTPATIENT)
Age: 42
End: 2022-12-24

## 2023-11-25 ENCOUNTER — HEALTH MAINTENANCE LETTER (OUTPATIENT)
Age: 43
End: 2023-11-25

## 2024-02-03 ENCOUNTER — HEALTH MAINTENANCE LETTER (OUTPATIENT)
Age: 44
End: 2024-02-03

## 2025-04-28 ENCOUNTER — HOSPITAL ENCOUNTER (EMERGENCY)
Facility: CLINIC | Age: 45
Discharge: HOME | End: 2025-04-28
Attending: FAMILY MEDICINE | Admitting: FAMILY MEDICINE
Payer: COMMERCIAL

## 2025-04-28 VITALS
WEIGHT: 278 LBS | SYSTOLIC BLOOD PRESSURE: 141 MMHG | RESPIRATION RATE: 18 BRPM | BODY MASS INDEX: 39.8 KG/M2 | HEIGHT: 70 IN | TEMPERATURE: 98.4 F | OXYGEN SATURATION: 96 % | DIASTOLIC BLOOD PRESSURE: 83 MMHG | HEART RATE: 88 BPM

## 2025-04-28 DIAGNOSIS — R53.1 WEAKNESS GENERALIZED: ICD-10-CM

## 2025-04-28 DIAGNOSIS — E89.6 HX OF PARTIAL ADRENALECTOMY: ICD-10-CM

## 2025-04-28 DIAGNOSIS — R42 LIGHT HEADEDNESS: ICD-10-CM

## 2025-04-28 LAB
ALBUMIN SERPL BCG-MCNC: 4.5 G/DL (ref 3.5–5.2)
ALBUMIN UR-MCNC: NEGATIVE MG/DL
ALP SERPL-CCNC: 93 U/L (ref 40–150)
ALT SERPL W P-5'-P-CCNC: 20 U/L (ref 0–50)
ANION GAP SERPL CALCULATED.3IONS-SCNC: 11 MMOL/L (ref 7–15)
APPEARANCE UR: CLEAR
APTT PPP: 27 SECONDS (ref 22–38)
AST SERPL W P-5'-P-CCNC: 19 U/L (ref 0–45)
ATRIAL RATE - MUSE: 85 BPM
BASOPHILS # BLD AUTO: 0.1 10E3/UL (ref 0–0.2)
BASOPHILS NFR BLD AUTO: 1 %
BILIRUB SERPL-MCNC: 0.8 MG/DL
BILIRUB UR QL STRIP: NEGATIVE
BUN SERPL-MCNC: 12.8 MG/DL (ref 6–20)
CALCIUM SERPL-MCNC: 9.6 MG/DL (ref 8.8–10.4)
CHLORIDE SERPL-SCNC: 101 MMOL/L (ref 98–107)
COLOR UR AUTO: ABNORMAL
CREAT SERPL-MCNC: 0.59 MG/DL (ref 0.51–0.95)
DIASTOLIC BLOOD PRESSURE - MUSE: NORMAL MMHG
EGFRCR SERPLBLD CKD-EPI 2021: >90 ML/MIN/1.73M2
EOSINOPHIL # BLD AUTO: 0.2 10E3/UL (ref 0–0.7)
EOSINOPHIL NFR BLD AUTO: 3 %
ERYTHROCYTE [DISTWIDTH] IN BLOOD BY AUTOMATED COUNT: 12 % (ref 10–15)
GLUCOSE SERPL-MCNC: 94 MG/DL (ref 70–99)
GLUCOSE UR STRIP-MCNC: NEGATIVE MG/DL
HCG SERPL QL: NEGATIVE
HCO3 SERPL-SCNC: 25 MMOL/L (ref 22–29)
HCT VFR BLD AUTO: 43 % (ref 35–47)
HGB BLD-MCNC: 14.7 G/DL (ref 11.7–15.7)
HGB UR QL STRIP: NEGATIVE
IMM GRANULOCYTES # BLD: 0 10E3/UL
IMM GRANULOCYTES NFR BLD: 0 %
INR PPP: 0.92 (ref 0.85–1.15)
INTERPRETATION ECG - MUSE: NORMAL
KETONES UR STRIP-MCNC: NEGATIVE MG/DL
LACTATE SERPL-SCNC: 0.9 MMOL/L (ref 0.7–2)
LEUKOCYTE ESTERASE UR QL STRIP: ABNORMAL
LIPASE SERPL-CCNC: 21 U/L (ref 13–60)
LYMPHOCYTES # BLD AUTO: 2.2 10E3/UL (ref 0.8–5.3)
LYMPHOCYTES NFR BLD AUTO: 30 %
MAGNESIUM SERPL-MCNC: 2 MG/DL (ref 1.7–2.3)
MCH RBC QN AUTO: 30.4 PG (ref 26.5–33)
MCHC RBC AUTO-ENTMCNC: 34.2 G/DL (ref 31.5–36.5)
MCV RBC AUTO: 89 FL (ref 78–100)
MONOCYTES # BLD AUTO: 0.6 10E3/UL (ref 0–1.3)
MONOCYTES NFR BLD AUTO: 9 %
NEUTROPHILS # BLD AUTO: 4.1 10E3/UL (ref 1.6–8.3)
NEUTROPHILS NFR BLD AUTO: 57 %
NITRATE UR QL: NEGATIVE
NRBC # BLD AUTO: 0 10E3/UL
NRBC BLD AUTO-RTO: 0 /100
P AXIS - MUSE: 21 DEGREES
PH UR STRIP: 6.5 [PH] (ref 5–7)
PLATELET # BLD AUTO: 277 10E3/UL (ref 150–450)
POTASSIUM SERPL-SCNC: 4.1 MMOL/L (ref 3.4–5.3)
PR INTERVAL - MUSE: 162 MS
PROT SERPL-MCNC: 8.2 G/DL (ref 6.4–8.3)
QRS DURATION - MUSE: 100 MS
QT - MUSE: 394 MS
QTC - MUSE: 468 MS
R AXIS - MUSE: -8 DEGREES
RBC # BLD AUTO: 4.83 10E6/UL (ref 3.8–5.2)
RBC URINE: <1 /HPF
SODIUM SERPL-SCNC: 137 MMOL/L (ref 135–145)
SP GR UR STRIP: 1 (ref 1–1.03)
SQUAMOUS EPITHELIAL: 1 /HPF
SYSTOLIC BLOOD PRESSURE - MUSE: NORMAL MMHG
T AXIS - MUSE: 7 DEGREES
TROPONIN T SERPL HS-MCNC: <6 NG/L
TSH SERPL DL<=0.005 MIU/L-ACNC: 1.34 UIU/ML (ref 0.3–4.2)
UROBILINOGEN UR STRIP-MCNC: NORMAL MG/DL
VENTRICULAR RATE- MUSE: 85 BPM
WBC # BLD AUTO: 7.2 10E3/UL (ref 4–11)
WBC URINE: <1 /HPF

## 2025-04-28 PROCEDURE — 99284 EMERGENCY DEPT VISIT MOD MDM: CPT | Performed by: FAMILY MEDICINE

## 2025-04-28 PROCEDURE — 84484 ASSAY OF TROPONIN QUANT: CPT | Performed by: FAMILY MEDICINE

## 2025-04-28 PROCEDURE — 83605 ASSAY OF LACTIC ACID: CPT | Performed by: FAMILY MEDICINE

## 2025-04-28 PROCEDURE — 83690 ASSAY OF LIPASE: CPT | Performed by: FAMILY MEDICINE

## 2025-04-28 PROCEDURE — 83735 ASSAY OF MAGNESIUM: CPT | Performed by: FAMILY MEDICINE

## 2025-04-28 PROCEDURE — 85610 PROTHROMBIN TIME: CPT | Performed by: FAMILY MEDICINE

## 2025-04-28 PROCEDURE — 85730 THROMBOPLASTIN TIME PARTIAL: CPT | Performed by: FAMILY MEDICINE

## 2025-04-28 PROCEDURE — 81001 URINALYSIS AUTO W/SCOPE: CPT | Performed by: FAMILY MEDICINE

## 2025-04-28 PROCEDURE — 80053 COMPREHEN METABOLIC PANEL: CPT | Performed by: FAMILY MEDICINE

## 2025-04-28 PROCEDURE — 84703 CHORIONIC GONADOTROPIN ASSAY: CPT | Performed by: FAMILY MEDICINE

## 2025-04-28 PROCEDURE — 93005 ELECTROCARDIOGRAM TRACING: CPT | Performed by: FAMILY MEDICINE

## 2025-04-28 PROCEDURE — 85004 AUTOMATED DIFF WBC COUNT: CPT | Performed by: FAMILY MEDICINE

## 2025-04-28 PROCEDURE — 99284 EMERGENCY DEPT VISIT MOD MDM: CPT | Mod: 25 | Performed by: FAMILY MEDICINE

## 2025-04-28 PROCEDURE — 258N000003 HC RX IP 258 OP 636: Performed by: FAMILY MEDICINE

## 2025-04-28 PROCEDURE — 96374 THER/PROPH/DIAG INJ IV PUSH: CPT | Performed by: FAMILY MEDICINE

## 2025-04-28 PROCEDURE — 84443 ASSAY THYROID STIM HORMONE: CPT | Performed by: FAMILY MEDICINE

## 2025-04-28 PROCEDURE — 250N000011 HC RX IP 250 OP 636: Performed by: FAMILY MEDICINE

## 2025-04-28 PROCEDURE — 93010 ELECTROCARDIOGRAM REPORT: CPT | Performed by: FAMILY MEDICINE

## 2025-04-28 PROCEDURE — 96361 HYDRATE IV INFUSION ADD-ON: CPT | Performed by: FAMILY MEDICINE

## 2025-04-28 RX ORDER — HYDROCORTISONE SODIUM SUCCINATE 100 MG/2ML
25 INJECTION INTRAMUSCULAR; INTRAVENOUS ONCE
Status: COMPLETED | OUTPATIENT
Start: 2025-04-28 | End: 2025-04-28

## 2025-04-28 RX ORDER — LIDOCAINE 40 MG/G
CREAM TOPICAL
Status: DISCONTINUED | OUTPATIENT
Start: 2025-04-28 | End: 2025-04-28 | Stop reason: HOSPADM

## 2025-04-28 RX ADMIN — HYDROCORTISONE SODIUM SUCCINATE 25 MG: 100 INJECTION, POWDER, FOR SOLUTION INTRAMUSCULAR; INTRAVENOUS at 13:31

## 2025-04-28 RX ADMIN — SODIUM CHLORIDE 500 ML: 0.9 INJECTION, SOLUTION INTRAVENOUS at 13:02

## 2025-04-28 ASSESSMENT — ACTIVITIES OF DAILY LIVING (ADL)
ADLS_ACUITY_SCORE: 41

## 2025-04-28 ASSESSMENT — COLUMBIA-SUICIDE SEVERITY RATING SCALE - C-SSRS
1. IN THE PAST MONTH, HAVE YOU WISHED YOU WERE DEAD OR WISHED YOU COULD GO TO SLEEP AND NOT WAKE UP?: NO
2. HAVE YOU ACTUALLY HAD ANY THOUGHTS OF KILLING YOURSELF IN THE PAST MONTH?: NO
6. HAVE YOU EVER DONE ANYTHING, STARTED TO DO ANYTHING, OR PREPARED TO DO ANYTHING TO END YOUR LIFE?: NO

## 2025-04-28 ASSESSMENT — ENCOUNTER SYMPTOMS: ALTERED MENTAL STATUS: 1

## 2025-04-28 NOTE — ED PROVIDER NOTES
[facility] EMERGENCY DEPARTMENT ([facility])    25      ED PROVIDER NOTE   History     Chief Complaint   Patient presents with    Abdominal Pain    Altered Mental Status    Dizziness    Generalized Weakness     The history is provided by the patient and medical records.   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  History as noted below.  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  Patient was over in the field in the ER with her son waiting and then did not feel well today.  Has some abdominal pain that comes and goes is gone currently seems to migrate throughout her abdomen some palpitations, lightheadedness generalized weakness noted.  Patient concerned this is still the adrenal insufficiency and would like a dose of hydrocortisone also.  No headache no neurochanges no current shortness of breath      Per chart review patient was seen in the ED on 2025 for insomnia and palpitations s/p adrenal gland removal on 3/25/2025. Her workup for palpitations had no significant electrolyte derangement. EKG revealed no acute ST elevation or depression concerning for acute ischemia. Noted for negative D-dimer and troponin negative. Patient was prescribed a 7 day course ( - ) of hydrocortisone 10 mg and 5 mg that was recommended by endocrinology.         Past Medical History  Past Medical History:   Diagnosis Date    Family history of diabetes mellitus (DM)     father and 2 sisters    Hypertension 2010    Need help finding tx options    Hypothyroid 10/2003     Past Surgical History:   Procedure Laterality Date    C ANESTH, SECTION       x2    COLONOSCOPY  2006    EYE SURGERY Right     Strabismus correction    GYN SURGERY  2004 and 2008    C-sections    HC REMOVAL GALLBLADDER  1998    SURGICAL HISTORY OF -   1985    eye surgery for lazy eye     chlorthalidone (HYGROTON) 25 MG tablet  levothyroxine (SYNTHROID/LEVOTHROID) 88 MCG tablet  MULTIPLE VITAMINS-CALCIUM PO  QNASL 80 MCG/ACT Nasal Spray      Allergies   Allergen Reactions    Amlodipine Anaphylaxis    Ace Inhibitors Angioedema and Cough     Other Reaction(s): Cough, Not available    Food      PN: LW Other1: -Gluten Sensitivity    Gluten Meal Nausea and Vomiting    Losartan Nausea and GI Disturbance     Family History  Family History   Problem Relation Age of Onset    Lipids Father     Diabetes Father         Type 2    Hypertension Father     Hyperlipidemia Father     Mental Illness Father         Bipolar Disorder    Cerebrovascular Disease Mother     C.A.D. Mother     Hypertension Mother     Depression Mother     Obesity Mother     Diabetes Mother     Diabetes Sister         Type 2    Diabetes Sister         Type 2    Diabetes Sister     Hypertension Sister     Diabetes Sister     Hypertension Sister     Thyroid Disease Sister     Obesity Sister     Hypertension Paternal Grandfather     Prostate Cancer Paternal Grandfather     Other Cancer Maternal Grandmother         Leukemia (Childhood Leukemia, reportedly linked to too many antibiotics)    Thyroid Disease Maternal Grandmother     Anesthesia Reaction Son     Asthma Son     Asthma Daughter     Thyroid Disease Sister      Social History   Social History     Tobacco Use    Smoking status: Never    Smokeless tobacco: Never   Substance Use Topics    Alcohol use: Yes     Comment: Rarely, only at holiday gatherings with in-laws    Drug use: No      A medically appropriate review of systems was performed with pertinent positives and negatives noted in the HPI, and all other systems negative.    Physical Exam   BP: (!) 147/79  Pulse: 94  Temp: 98.1  " F (36.7  C)  Resp: 17  Height: 177.8 cm (5' 10\")  Weight: 126.1 kg (278 lb)  SpO2: 99 %  Physical Exam  Vitals and nursing note reviewed.   Constitutional:       General: She is in acute distress.      Appearance: Normal appearance. She is not toxic-appearing or diaphoretic.   HENT:      Head: Atraumatic.      Nose: Nose normal.      Mouth/Throat:      Mouth: Mucous membranes are moist.      Pharynx: Oropharynx is clear.   Eyes:      General: No scleral icterus.     Conjunctiva/sclera: Conjunctivae normal.   Cardiovascular:      Rate and Rhythm: Normal rate and regular rhythm.      Heart sounds: Normal heart sounds.   Pulmonary:      Effort: No respiratory distress.      Breath sounds: Normal breath sounds. No stridor. No wheezing.   Abdominal:      General: Abdomen is flat. There is no distension.      Palpations: Abdomen is soft. There is no mass.      Tenderness: There is no abdominal tenderness. There is no guarding.      Hernia: No hernia is present.   Musculoskeletal:         General: No swelling or tenderness.      Cervical back: Normal range of motion and neck supple. No rigidity.   Skin:     General: Skin is warm.      Capillary Refill: Capillary refill takes less than 2 seconds.      Coloration: Skin is not jaundiced or pale.      Findings: No rash.   Neurological:      General: No focal deficit present.      Mental Status: She is alert. Mental status is at baseline.   Psychiatric:      Comments: Appropriate here in the ER           ED Course, Procedures, & Data        Records reviewed in epic as noted with patient history given also follows with [facility] recent procedures etc. Meds reviewed etc.    The ER we did do an EKG in the ER we did give a 500 cc bolus of fluids along with this did give Solu-Cortef 25 mg IV x 1.  Vitals been stable.  Labs drawn lactic acid 0.9 cardiac workup negative lipase normal demonstrates normal urinalysis otherwise negative.  White count 7.2 hemoglobin 14.7.  TSH " normal  Patient reassessed here feels much better at this point [place] going home will follow-up with her endocrinologist at [facility] for ongoing recommendations.  At this point certainly she improved [place] the IV fluids and the extra Solu-Cortef will have patient continue to follow-up as noted.    Procedures            EKG Interpretation:      Interpreted by Darrin Zuniga MD  Time reviewed: 1151  Symptoms at time of EKG: weakness   Rhythm: normal sinus   Rate: normal  Axis: normal  Ectopy: none  Conduction: normal  ST Segments/ T Waves: No ST-T wave changes  Q Waves: none  Comparison to prior: No old EKG available    Clinical Impression: normal EKG            Results for orders placed or performed during the hospital encounter of 04/28/25   INR     Status: Normal   Result Value Ref Range    INR 0.92 0.85 - 1.15   Partial thromboplastin time     Status: Normal   Result Value Ref Range    aPTT 27 22 - 38 Seconds   Comprehensive metabolic panel     Status: Normal   Result Value Ref Range    Sodium 137 135 - 145 mmol/L    Potassium 4.1 3.4 - 5.3 mmol/L    Carbon Dioxide (CO2) 25 22 - 29 mmol/L    Anion Gap 11 7 - 15 mmol/L    Urea Nitrogen 12.8 6.0 - 20.0 mg/dL    Creatinine 0.59 0.51 - 0.95 mg/dL    GFR Estimate >90 >60 mL/min/1.73m2    Calcium 9.6 8.8 - 10.4 mg/dL    Chloride 101 98 - 107 mmol/L    Glucose 94 70 - 99 mg/dL    Alkaline Phosphatase 93 40 - 150 U/L    AST 19 0 - 45 U/L    ALT 20 0 - 50 U/L    Protein Total 8.2 6.4 - 8.3 g/dL    Albumin 4.5 3.5 - 5.2 g/dL    Bilirubin Total 0.8 <=1.2 mg/dL   Magnesium     Status: Normal   Result Value Ref Range    Magnesium 2.0 1.7 - 2.3 mg/dL   Lipase     Status: Normal   Result Value Ref Range    Lipase 21 13 - 60 U/L   Lactic Acid Whole Blood with 1X Repeat in 2 HR when >2     Status: Normal   Result Value Ref Range    Lactic Acid, Initial 0.9 0.7 - 2.0 mmol/L   Troponin T, High Sensitivity     Status: Normal   Result Value Ref Range    Troponin T, High  Sensitivity <6 <=14 ng/L   TSH     Status: Normal   Result Value Ref Range    TSH 1.34 0.30 - 4.20 uIU/mL   HCG qualitative Blood     Status: Normal   Result Value Ref Range    hCG Serum Qualitative Negative Negative   UA with Microscopic reflex to Culture     Status: Abnormal    Specimen: Urine, Midstream   Result Value Ref Range    Color Urine Straw Colorless, Straw, Light Yellow, Yellow    Appearance Urine Clear Clear    Glucose Urine Negative Negative mg/dL    Bilirubin Urine Negative Negative    Ketones Urine Negative Negative mg/dL    Specific Gravity Urine 1.005 1.003 - 1.035    Blood Urine Negative Negative    pH Urine 6.5 5.0 - 7.0    Protein Albumin Urine Negative Negative mg/dL    Urobilinogen Urine Normal Normal mg/dL    Nitrite Urine Negative Negative    Leukocyte Esterase Urine Trace (A) Negative    RBC Urine <1 <=2 /HPF    WBC Urine <1 <=5 /HPF    Squamous Epithelials Urine 1 <=1 /HPF    Narrative    Urine Culture not indicated   CBC with platelets and differential     Status: None   Result Value Ref Range    WBC Count 7.2 4.0 - 11.0 10e3/uL    RBC Count 4.83 3.80 - 5.20 10e6/uL    Hemoglobin 14.7 11.7 - 15.7 g/dL    Hematocrit 43.0 35.0 - 47.0 %    MCV 89 78 - 100 fL    MCH 30.4 26.5 - 33.0 pg    MCHC 34.2 31.5 - 36.5 g/dL    RDW 12.0 10.0 - 15.0 %    Platelet Count 277 150 - 450 10e3/uL    % Neutrophils 57 %    % Lymphocytes 30 %    % Monocytes 9 %    % Eosinophils 3 %    % Basophils 1 %    % Immature Granulocytes 0 %    NRBCs per 100 WBC 0 <1 /100    Absolute Neutrophils 4.1 1.6 - 8.3 10e3/uL    Absolute Lymphocytes 2.2 0.8 - 5.3 10e3/uL    Absolute Monocytes 0.6 0.0 - 1.3 10e3/uL    Absolute Eosinophils 0.2 0.0 - 0.7 10e3/uL    Absolute Basophils 0.1 0.0 - 0.2 10e3/uL    Absolute Immature Granulocytes 0.0 <=0.4 10e3/uL    Absolute NRBCs 0.0 10e3/uL   EKG 12 lead     Status: None   Result Value Ref Range    Systolic Blood Pressure  mmHg    Diastolic Blood Pressure  mmHg    Ventricular Rate 85 BPM     Atrial Rate 85 BPM    SC Interval 162 ms    QRS Duration 100 ms     ms    QTc 468 ms    P Axis 21 degrees    R AXIS -8 degrees    T Axis 7 degrees    Interpretation ECG       Unconfirmed report - interpretation of this ECG is computer generated - see medical record for final interpretation  Sinus rhythm  Normal ECG    Confirmed by - EMERGENCY ROOM, PHYSICIAN ([id number]),  Danica Rojas ([id number]) on 4/28/2025 6:45:00 PM     CBC with platelets differential     Status: None    Narrative    The following orders were created for panel order CBC with platelets differential.  Procedure                               Abnormality         Status                     ---------                               -----------         ------                     CBC with platelets and ...[[id number]]                      Final result                 Please view results for these tests on the individual orders.     Medications   sodium chloride 0.9% BOLUS 500 mL (0 mLs Intravenous Stopped 4/28/25 1352)   hydrocortisone sodium succinate PF (solu-CORTEF) injection 25 mg (25 mg Intravenous $Given 4/28/25 1331)     Labs Ordered and Resulted from Time of ED Arrival to Time of ED Departure   ROUTINE UA WITH MICROSCOPIC REFLEX TO CULTURE - Abnormal       Result Value    Color Urine Straw      Appearance Urine Clear      Glucose Urine Negative      Bilirubin Urine Negative      Ketones Urine Negative      Specific Gravity Urine 1.005      Blood Urine Negative      pH Urine 6.5      Protein Albumin Urine Negative      Urobilinogen Urine Normal      Nitrite Urine Negative      Leukocyte Esterase Urine Trace (*)     RBC Urine <1      WBC Urine <1      Squamous Epithelials Urine 1     INR - Normal    INR 0.92     PARTIAL THROMBOPLASTIN TIME - Normal    aPTT 27     COMPREHENSIVE METABOLIC PANEL - Normal    Sodium 137      Potassium 4.1      Carbon Dioxide (CO2) 25      Anion Gap 11      Urea Nitrogen 12.8      Creatinine 0.59      GFR  Estimate >90      Calcium 9.6      Chloride 101      Glucose 94      Alkaline Phosphatase 93      AST 19      ALT 20      Protein Total 8.2      Albumin 4.5      Bilirubin Total 0.8     MAGNESIUM - Normal    Magnesium 2.0     LIPASE - Normal    Lipase 21     LACTIC ACID WHOLE BLOOD WITH 1X REPEAT IN 2 HR WHEN >2 - Normal    Lactic Acid, Initial 0.9     TROPONIN T, HIGH SENSITIVITY - Normal    Troponin T, High Sensitivity <6     TSH - Normal    TSH 1.34     HCG QUALITATIVE PREGNANCY - Normal    hCG Serum Qualitative Negative     CBC WITH PLATELETS AND DIFFERENTIAL    WBC Count 7.2      RBC Count 4.83      Hemoglobin 14.7      Hematocrit 43.0      MCV 89      MCH 30.4      MCHC 34.2      RDW 12.0      Platelet Count 277      % Neutrophils 57      % Lymphocytes 30      % Monocytes 9      % Eosinophils 3      % Basophils 1      % Immature Granulocytes 0      NRBCs per 100 WBC 0      Absolute Neutrophils 4.1      Absolute Lymphocytes 2.2      Absolute Monocytes 0.6      Absolute Eosinophils 0.2      Absolute Basophils 0.1      Absolute Immature Granulocytes 0.0      Absolute NRBCs 0.0       No orders to display          Critical care was not performed.     Medical Decision Making  The patient's presentation was of moderate complexity (an acute illness with systemic symptoms).    The patient's evaluation involved:  review of external note(s) from 3+ sources (see separate area of note for details)  review of 3+ test result(s) ordered prior to this encounter (see separate area of note for details)  ordering and/or review of 3+ test(s) in this encounter (see separate area of note for details)    The patient's management necessitated moderate risk (prescription drug management including medications given in the ED).    Assessment & Plan   44-year-old female with history of recent left adrenalectomy a month ago follows at [facility] patient has been increasing her cortisone slightly throughout the day as she feels that she is  adrenal insufficient.  Patient presented with some weakness and palpitations nonfocal findings etc. has had some transient abdominal pain that resolved.  Evaluated here in the ER patient felt she was in need of some extra hydrocortisone.  Patient had taken 1 dose today.  Normally takes 27.5 mg throughout the day total.  EKG did not show any hyperacute changes vitally stable not hypotensive other labs normal patient here in the ER did receive 25 mg of*Solu-Cortef IV on 5 cc normal saline patient feeling better at this point comfortable going home feels at this point she still is managing it some adrenal insufficient symptoms she has follow-up with [facility] and will do so.  Will return to concerns       I have reviewed the nursing notes. I have reviewed the findings, diagnosis, plan and need for follow up with the patient.    Discharge Medication List as of 4/28/2025  3:06 PM          Final diagnoses:   Hx of partial adrenalectomy - left adrenalectomy   Light headedness   Weakness generalized       Darrin Zuniga MD  [facility] EMERGENCY DEPARTMENT  4/28/2025    This note was created at least in part by the use of dragon voice dictation system. Inadvertent typographical errors may still exist.  Darrin Zuniga MD.  Patient evaluated in the emergency department during the COVID-19 pandemic period. Careful attention to patients safety was addressed throughout the evaluation. Evaluation and treatment management was initiated with disposition made efficiently and appropriate as possible to minimize any risk of potential exposure to patient during this evaluation.       Darrin Zuniga MD  04/28/25 1930

## 2025-04-28 NOTE — DISCHARGE INSTRUCTIONS
Home.  Labs and EKG look good.  You are better with IV solucortef 25mg and also IV fluids.  Discuss with MD regarding further recommendations.  Return if any concerns.    Results for orders placed or performed during the hospital encounter of 04/28/25   INR     Status: Normal   Result Value Ref Range    INR 0.92 0.85 - 1.15   Partial thromboplastin time     Status: Normal   Result Value Ref Range    aPTT 27 22 - 38 Seconds   Comprehensive metabolic panel     Status: Normal   Result Value Ref Range    Sodium 137 135 - 145 mmol/L    Potassium 4.1 3.4 - 5.3 mmol/L    Carbon Dioxide (CO2) 25 22 - 29 mmol/L    Anion Gap 11 7 - 15 mmol/L    Urea Nitrogen 12.8 6.0 - 20.0 mg/dL    Creatinine 0.59 0.51 - 0.95 mg/dL    GFR Estimate >90 >60 mL/min/1.73m2    Calcium 9.6 8.8 - 10.4 mg/dL    Chloride 101 98 - 107 mmol/L    Glucose 94 70 - 99 mg/dL    Alkaline Phosphatase 93 40 - 150 U/L    AST 19 0 - 45 U/L    ALT 20 0 - 50 U/L    Protein Total 8.2 6.4 - 8.3 g/dL    Albumin 4.5 3.5 - 5.2 g/dL    Bilirubin Total 0.8 <=1.2 mg/dL   Magnesium     Status: Normal   Result Value Ref Range    Magnesium 2.0 1.7 - 2.3 mg/dL   Lipase     Status: Normal   Result Value Ref Range    Lipase 21 13 - 60 U/L   Lactic Acid Whole Blood with 1X Repeat in 2 HR when >2     Status: Normal   Result Value Ref Range    Lactic Acid, Initial 0.9 0.7 - 2.0 mmol/L   Troponin T, High Sensitivity     Status: Normal   Result Value Ref Range    Troponin T, High Sensitivity <6 <=14 ng/L   TSH     Status: Normal   Result Value Ref Range    TSH 1.34 0.30 - 4.20 uIU/mL   HCG qualitative Blood     Status: Normal   Result Value Ref Range    hCG Serum Qualitative Negative Negative   UA with Microscopic reflex to Culture     Status: Abnormal    Specimen: Urine, Midstream   Result Value Ref Range    Color Urine Straw Colorless, Straw, Light Yellow, Yellow    Appearance Urine Clear Clear    Glucose Urine Negative Negative mg/dL    Bilirubin Urine Negative Negative     Ketones Urine Negative Negative mg/dL    Specific Gravity Urine 1.005 1.003 - 1.035    Blood Urine Negative Negative    pH Urine 6.5 5.0 - 7.0    Protein Albumin Urine Negative Negative mg/dL    Urobilinogen Urine Normal Normal mg/dL    Nitrite Urine Negative Negative    Leukocyte Esterase Urine Trace (A) Negative    RBC Urine <1 <=2 /HPF    WBC Urine <1 <=5 /HPF    Squamous Epithelials Urine 1 <=1 /HPF    Narrative    Urine Culture not indicated   CBC with platelets and differential     Status: None   Result Value Ref Range    WBC Count 7.2 4.0 - 11.0 10e3/uL    RBC Count 4.83 3.80 - 5.20 10e6/uL    Hemoglobin 14.7 11.7 - 15.7 g/dL    Hematocrit 43.0 35.0 - 47.0 %    MCV 89 78 - 100 fL    MCH 30.4 26.5 - 33.0 pg    MCHC 34.2 31.5 - 36.5 g/dL    RDW 12.0 10.0 - 15.0 %    Platelet Count 277 150 - 450 10e3/uL    % Neutrophils 57 %    % Lymphocytes 30 %    % Monocytes 9 %    % Eosinophils 3 %    % Basophils 1 %    % Immature Granulocytes 0 %    NRBCs per 100 WBC 0 <1 /100    Absolute Neutrophils 4.1 1.6 - 8.3 10e3/uL    Absolute Lymphocytes 2.2 0.8 - 5.3 10e3/uL    Absolute Monocytes 0.6 0.0 - 1.3 10e3/uL    Absolute Eosinophils 0.2 0.0 - 0.7 10e3/uL    Absolute Basophils 0.1 0.0 - 0.2 10e3/uL    Absolute Immature Granulocytes 0.0 <=0.4 10e3/uL    Absolute NRBCs 0.0 10e3/uL   EKG 12 lead     Status: None (Preliminary result)   Result Value Ref Range    Systolic Blood Pressure  mmHg    Diastolic Blood Pressure  mmHg    Ventricular Rate 85 BPM    Atrial Rate 85 BPM    [place] Interval 162 ms    QRS Duration 100 ms     ms    QTc 468 ms    P Axis 21 degrees    R AXIS -8 degrees    T Axis 7 degrees    Interpretation ECG Sinus rhythm  Normal ECG      CBC with platelets differential     Status: None    Narrative    The following orders were created for panel order CBC with platelets differential.  Procedure                               Abnormality         Status                     ---------                                -----------         ------                     CBC with platelets and ...[[id number]]                      Final result                 Please view results for these tests on the individual orders.

## 2025-04-28 NOTE — ED TRIAGE NOTES
Patient was with son at appointment in PEDS ED. Started feeling confused, heart racing, stabbing pains in right lower abdomen, nausea and lightheaded. States had L adrenalectomy on March 25, has been having issues since, believes she has adrenal insufficiency.

## 2025-05-02 ENCOUNTER — MEDICAL CORRESPONDENCE (OUTPATIENT)
Dept: HEALTH INFORMATION MANAGEMENT | Facility: CLINIC | Age: 45
End: 2025-05-02
Payer: COMMERCIAL

## 2025-05-05 ENCOUNTER — TRANSCRIBE ORDERS (OUTPATIENT)
Dept: OTHER | Age: 45
End: 2025-05-05

## 2025-05-05 DIAGNOSIS — E26.9 HYPERALDOSTERONISM: Primary | ICD-10-CM

## 2025-05-06 ENCOUNTER — TELEPHONE (OUTPATIENT)
Dept: ENDOCRINOLOGY | Facility: CLINIC | Age: 45
End: 2025-05-06
Payer: COMMERCIAL

## 2025-05-06 NOTE — TELEPHONE ENCOUNTER
Left Voicemail (1st Attempt) for the patient to call back and schedule the following:    Appointment type: NEW ENDOCRINE  Provider: Any provider that manages Adrenal diagnosis.  Return date: First available slot  Specialty phone number: [phone]  Additional appointment(s) needed:N/A   Additonal Notes: Chad HOLMAN schedule per message below:    Please help schedule first available with Dr Field for adrenal. Transfer of care is first available. First available with any provider who manages adrenal. Pt would prefer Dr Field.   If unable to manage, may offer visit with different provider.   She sees ENDOs at [facility] and [place].   We cannot use a ZEHRA for this visit.       Thank you.   Anne Vaz, RN on 5/6/25 at 10:59 AM    Kris Santiago on 5/6/2025 at 2:21 PM

## 2025-05-10 ENCOUNTER — HEALTH MAINTENANCE LETTER (OUTPATIENT)
Age: 45
End: 2025-05-10

## 2025-05-23 ENCOUNTER — LAB REQUISITION (OUTPATIENT)
Dept: LAB | Facility: CLINIC | Age: 45
End: 2025-05-23
Payer: COMMERCIAL

## 2025-05-23 DIAGNOSIS — Z12.4 ENCOUNTER FOR SCREENING FOR MALIGNANT NEOPLASM OF CERVIX: ICD-10-CM

## 2025-05-23 PROCEDURE — G0145 SCR C/V CYTO,THINLAYER,RESCR: HCPCS | Mod: ORL | Performed by: NURSE PRACTITIONER

## 2025-05-23 PROCEDURE — 87624 HPV HI-RISK TYP POOLED RSLT: CPT | Mod: ORL | Performed by: NURSE PRACTITIONER

## 2025-05-27 LAB
HPV HR 12 DNA CVX QL NAA+PROBE: NEGATIVE
HPV16 DNA CVX QL NAA+PROBE: NEGATIVE
HPV18 DNA CVX QL NAA+PROBE: NEGATIVE
HUMAN PAPILLOMA VIRUS FINAL DIAGNOSIS: NORMAL

## 2025-05-29 LAB
BKR AP ASSOCIATED HPV REPORT: NORMAL
BKR LAB AP GYN ADEQUACY: NORMAL
BKR LAB AP GYN INTERPRETATION: NORMAL
BKR LAB AP LMP: NORMAL
BKR LAB AP PREVIOUS ABNL DX: NORMAL
BKR LAB AP PREVIOUS ABNORMAL: NORMAL
PATH REPORT.COMMENTS IMP SPEC: NORMAL
PATH REPORT.COMMENTS IMP SPEC: NORMAL
PATH REPORT.RELEVANT HX SPEC: NORMAL

## 2025-06-04 ENCOUNTER — MEDICAL CORRESPONDENCE (OUTPATIENT)
Dept: HEALTH INFORMATION MANAGEMENT | Facility: CLINIC | Age: 45
End: 2025-06-04
Payer: COMMERCIAL

## 2025-06-09 ENCOUNTER — PATIENT OUTREACH (OUTPATIENT)
Dept: CARE COORDINATION | Facility: CLINIC | Age: 45
End: 2025-06-09
Payer: COMMERCIAL

## 2025-06-11 ENCOUNTER — PATIENT OUTREACH (OUTPATIENT)
Dept: CARE COORDINATION | Facility: CLINIC | Age: 45
End: 2025-06-11
Payer: COMMERCIAL

## 2025-06-14 ENCOUNTER — HOSPITAL ENCOUNTER (EMERGENCY)
Facility: CLINIC | Age: 45
Discharge: HOME | End: 2025-06-14
Attending: EMERGENCY MEDICINE
Payer: COMMERCIAL

## 2025-06-14 ENCOUNTER — APPOINTMENT (OUTPATIENT)
Dept: GENERAL RADIOLOGY | Facility: CLINIC | Age: 45
End: 2025-06-14
Attending: EMERGENCY MEDICINE
Payer: COMMERCIAL

## 2025-06-14 VITALS
DIASTOLIC BLOOD PRESSURE: 78 MMHG | RESPIRATION RATE: 16 BRPM | TEMPERATURE: 98.5 F | SYSTOLIC BLOOD PRESSURE: 124 MMHG | OXYGEN SATURATION: 94 % | HEART RATE: 80 BPM

## 2025-06-14 DIAGNOSIS — R00.2 PALPITATIONS: ICD-10-CM

## 2025-06-14 LAB
ANION GAP SERPL CALCULATED.3IONS-SCNC: 14 MMOL/L (ref 7–15)
BASOPHILS # BLD AUTO: 0.1 10E3/UL (ref 0–0.2)
BASOPHILS NFR BLD AUTO: 1 %
BUN SERPL-MCNC: 11.9 MG/DL (ref 6–20)
CALCIUM SERPL-MCNC: 9.3 MG/DL (ref 8.8–10.4)
CHLORIDE SERPL-SCNC: 99 MMOL/L (ref 98–107)
CREAT SERPL-MCNC: 0.53 MG/DL (ref 0.51–0.95)
D DIMER PPP FEU-MCNC: <0.27 UG/ML FEU (ref 0–0.5)
EGFRCR SERPLBLD CKD-EPI 2021: >90 ML/MIN/1.73M2
EOSINOPHIL # BLD AUTO: 0.3 10E3/UL (ref 0–0.7)
EOSINOPHIL NFR BLD AUTO: 3 %
ERYTHROCYTE [DISTWIDTH] IN BLOOD BY AUTOMATED COUNT: 12.2 % (ref 10–15)
GLUCOSE SERPL-MCNC: 79 MG/DL (ref 70–99)
HCO3 SERPL-SCNC: 24 MMOL/L (ref 22–29)
HCT VFR BLD AUTO: 44 % (ref 35–47)
HGB BLD-MCNC: 15 G/DL (ref 11.7–15.7)
HOLD SPECIMEN: NORMAL
IMM GRANULOCYTES # BLD: 0 10E3/UL
IMM GRANULOCYTES NFR BLD: 0 %
LYMPHOCYTES # BLD AUTO: 2.5 10E3/UL (ref 0.8–5.3)
LYMPHOCYTES NFR BLD AUTO: 26 %
MAGNESIUM SERPL-MCNC: 2.1 MG/DL (ref 1.7–2.3)
MCH RBC QN AUTO: 30.1 PG (ref 26.5–33)
MCHC RBC AUTO-ENTMCNC: 34.1 G/DL (ref 31.5–36.5)
MCV RBC AUTO: 88 FL (ref 78–100)
MONOCYTES # BLD AUTO: 1 10E3/UL (ref 0–1.3)
MONOCYTES NFR BLD AUTO: 11 %
NEUTROPHILS # BLD AUTO: 5.6 10E3/UL (ref 1.6–8.3)
NEUTROPHILS NFR BLD AUTO: 59 %
NRBC # BLD AUTO: 0 10E3/UL
NRBC BLD AUTO-RTO: 0 /100
NT-PROBNP SERPL-MCNC: 75 PG/ML (ref 0–192)
PLATELET # BLD AUTO: 299 10E3/UL (ref 150–450)
POTASSIUM SERPL-SCNC: 4.4 MMOL/L (ref 3.4–5.3)
RBC # BLD AUTO: 4.98 10E6/UL (ref 3.8–5.2)
SODIUM SERPL-SCNC: 137 MMOL/L (ref 135–145)
TROPONIN T SERPL HS-MCNC: 7 NG/L
TROPONIN T SERPL HS-MCNC: 7 NG/L
TSH SERPL DL<=0.005 MIU/L-ACNC: 2.33 UIU/ML (ref 0.3–4.2)
WBC # BLD AUTO: 9.5 10E3/UL (ref 4–11)

## 2025-06-14 PROCEDURE — 99284 EMERGENCY DEPT VISIT MOD MDM: CPT | Performed by: EMERGENCY MEDICINE

## 2025-06-14 PROCEDURE — 99285 EMERGENCY DEPT VISIT HI MDM: CPT | Mod: 25 | Performed by: EMERGENCY MEDICINE

## 2025-06-14 PROCEDURE — 250N000013 HC RX MED GY IP 250 OP 250 PS 637: Performed by: EMERGENCY MEDICINE

## 2025-06-14 PROCEDURE — 85379 FIBRIN DEGRADATION QUANT: CPT | Performed by: EMERGENCY MEDICINE

## 2025-06-14 PROCEDURE — 83880 ASSAY OF NATRIURETIC PEPTIDE: CPT | Performed by: EMERGENCY MEDICINE

## 2025-06-14 PROCEDURE — 93005 ELECTROCARDIOGRAM TRACING: CPT | Mod: 76 | Performed by: EMERGENCY MEDICINE

## 2025-06-14 PROCEDURE — 84443 ASSAY THYROID STIM HORMONE: CPT | Performed by: EMERGENCY MEDICINE

## 2025-06-14 PROCEDURE — 84484 ASSAY OF TROPONIN QUANT: CPT | Performed by: EMERGENCY MEDICINE

## 2025-06-14 PROCEDURE — 80048 BASIC METABOLIC PNL TOTAL CA: CPT | Performed by: EMERGENCY MEDICINE

## 2025-06-14 PROCEDURE — 93010 ELECTROCARDIOGRAM REPORT: CPT | Mod: 76 | Performed by: EMERGENCY MEDICINE

## 2025-06-14 PROCEDURE — 93005 ELECTROCARDIOGRAM TRACING: CPT | Performed by: EMERGENCY MEDICINE

## 2025-06-14 PROCEDURE — 71046 X-RAY EXAM CHEST 2 VIEWS: CPT

## 2025-06-14 PROCEDURE — 85004 AUTOMATED DIFF WBC COUNT: CPT | Performed by: EMERGENCY MEDICINE

## 2025-06-14 PROCEDURE — 83735 ASSAY OF MAGNESIUM: CPT | Performed by: EMERGENCY MEDICINE

## 2025-06-14 PROCEDURE — 36415 COLL VENOUS BLD VENIPUNCTURE: CPT | Performed by: EMERGENCY MEDICINE

## 2025-06-14 RX ORDER — HYDROCORTISONE 5 MG/1
5 TABLET ORAL 3 TIMES DAILY
COMMUNITY
Start: 2025-05-07 | End: 2025-07-14

## 2025-06-14 RX ORDER — HYDROCORTISONE 10 MG/1
10 TABLET ORAL EVERY MORNING
COMMUNITY
Start: 2025-04-18

## 2025-06-14 RX ORDER — ASPIRIN 81 MG/1
324 TABLET, CHEWABLE ORAL ONCE
Status: COMPLETED | OUTPATIENT
Start: 2025-06-14 | End: 2025-06-14

## 2025-06-14 RX ADMIN — ASPIRIN 81 MG CHEWABLE TABLET 324 MG: 81 TABLET CHEWABLE at 19:19

## 2025-06-14 ASSESSMENT — ACTIVITIES OF DAILY LIVING (ADL)
ADLS_ACUITY_SCORE: 41

## 2025-06-14 NOTE — ED PROVIDER NOTES
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 Patient reports her palpitations occur when her hydrocortisone doses change. Patient states before today she was taking 10 mg in the morning and 5 mg in the evening. Patient states last night she was told to switch to 5 mg in the morning, 5 mg at noon, 5 mg in the evening. Patient reports around 4:10 PM she started getting palpitations after checking her pulse oximeter. Patient reports cough but this has been her baseline for the past 3 years. Patient reports on and off chronic leg pain. Patient notes history of low potassium causing chest pain. Patient reports she has had abdominal pain but that has changed to back pain. Patient denies abdominal pain and back pain on exam. Patient denies chest pain, shortness of breath, fever, chills, bloody cough, nausea, and vomiting. Patient denies history of anxiety, smoking, blood clots, familial history of blood clots, and estrogen use.     Past Medical History  Past Medical History:   Diagnosis Date    Family history of diabetes mellitus (DM)     father and 2 sisters    Hypertension 2010    Need help finding tx options    Hypothyroid 10/2003     Past Surgical History:   Procedure Laterality Date    C ANESTH, SECTION      x2    COLONOSCOPY  2006    EYE SURGERY Right     Strabismus correction    GYN SURGERY   and     C-sections    HC REMOVAL GALLBLADDER      SURGICAL HISTORY OF -       eye surgery for lazy eye     hydrocortisone (CORTEF) 5 MG tablet  levothyroxine (SYNTHROID/LEVOTHROID) 88 MCG tablet  chlorthalidone (HYGROTON) 25 MG tablet  hydrocortisone (CORTEF) 10 MG  "tablet  MULTIPLE VITAMINS-CALCIUM PO  QNASL 80 MCG/ACT Nasal Spray      Allergies   Allergen Reactions    Amlodipine Anaphylaxis    Ace Inhibitors Angioedema and Cough     Other Reaction(s): Cough, Not available    Food      PN: LW Other1: -Gluten Sensitivity    Gluten Meal Nausea and Vomiting    Lisinopril Angioedema     Intestinal angioedema     Losartan Nausea and GI Disturbance     Family History  Family History   Problem Relation Age of Onset    Lipids Father     Diabetes Father         Type 2    Hypertension Father     Hyperlipidemia Father     Mental Illness Father         Bipolar Disorder    Cerebrovascular Disease Mother     C.A.D. Mother     Hypertension Mother     Depression Mother     Obesity Mother     Diabetes Mother     Diabetes Sister         Type 2    Diabetes Sister         Type 2    Diabetes Sister     Hypertension Sister     Diabetes Sister     Hypertension Sister     Thyroid Disease Sister     Obesity Sister     Hypertension Paternal Grandfather     Prostate Cancer Paternal Grandfather     Other Cancer Maternal Grandmother         Leukemia (Childhood Leukemia, reportedly linked to too many antibiotics)    Thyroid Disease Maternal Grandmother     Anesthesia Reaction Son     Asthma Son     Asthma Daughter     Thyroid Disease Sister      Social History   Social History     Tobacco Use    Smoking status: Never    Smokeless tobacco: Never   Substance Use Topics    Alcohol use: Yes     Comment: Rarely, only at holiday gatherings with in-laws    Drug use: No      A complete review of systems was performed with pertinent positives and negatives noted in the HPI, and all other systems negative.    Physical Exam   BP: (!) 178/75  Pulse: 83  Temp: 98.5  F (36.9  C)  Resp: 16  SpO2: 100 %  Physical Exam  ***    ED Course, Procedures, & Data      Procedures       {ED Course Selections (Optional):534523}  {ED Sepsis CMS Documentation (Optional):821217::\" \"}       Results for orders placed or performed during " the hospital encounter of 06/14/25   EKG 12 lead     Status: None (Preliminary result)   Result Value Ref Range    Systolic Blood Pressure  mmHg    Diastolic Blood Pressure  mmHg    Ventricular Rate 86 BPM    Atrial Rate 86 BPM    ND Interval 154 ms    QRS Duration 102 ms     ms    QTc 464 ms    P Axis 41 degrees    R AXIS -9 degrees    T Axis 18 degrees    Interpretation ECG       Sinus rhythm  Possible Left atrial enlargement  Left ventricular hypertrophy  Cannot rule out Septal infarct , age undetermined  Abnormal ECG       Medications - No data to display  Labs Ordered and Resulted from Time of ED Arrival to Time of ED Departure - No data to display  No orders to display          {Critical Care Performed?:172435}    Assessment & Plan    ***    I have reviewed the nursing notes. I have reviewed the findings, diagnosis, plan and need for follow up with the patient.    New Prescriptions    No medications on file       Final diagnoses:   None       I, Fito Sharpe, am serving as a trained medical scribe to document services personally performed by Baldemar Copeland MD based on the provider's statements to me on June 14, 2025.  This document has been checked and approved by the attending provider.    I, Baldemar Copeland MD, was physically present and have reviewed and verified the accuracy of this note documented by Fito Sharpe medical scribe.      Baldemar Copeland MD  [facility] EMERGENCY DEPARTMENT  6/14/2025   Tube[[id number]]                           Final result                 Please view results for these tests on the individual orders.   Extra Blue Top Tube     Status: None   Result Value Ref Range    Hold Specimen JIC    Extra Red Top Tube     Status: None   Result Value Ref Range    Hold Specimen JIC    Extra Green Top (Lithium Heparin) Tube     Status: None   Result Value Ref Range    Hold Specimen JIC    Extra Green Top (Lithium Heparin) Tube     Status: None   Result Value Ref Range    Hold Specimen     Extra Purple Top Tube     Status: None   Result Value Ref Range    Hold Specimen JIC    Basic metabolic panel     Status: Normal   Result Value Ref Range    Sodium 137 135 - 145 mmol/L    Potassium 4.4 3.4 - 5.3 mmol/L    Chloride 99 98 - 107 mmol/L    Carbon Dioxide (CO2) 24 22 - 29 mmol/L    Anion Gap 14 7 - 15 mmol/L    Urea Nitrogen 11.9 6.0 - 20.0 mg/dL    Creatinine 0.53 0.51 - 0.95 mg/dL    GFR Estimate >90 >60 mL/min/1.73m2    Calcium 9.3 8.8 - 10.4 mg/dL    Glucose 79 70 - 99 mg/dL   Troponin T, High Sensitivity     Status: Normal   Result Value Ref Range    Troponin T, High Sensitivity 7 <=14 ng/L   Magnesium     Status: Normal   Result Value Ref Range    Magnesium 2.1 1.7 - 2.3 mg/dL   D dimer quantitative     Status: Normal   Result Value Ref Range    D-Dimer Quantitative <0.27 0.00 - 0.50 ug/mL FEU    Narrative    This D-dimer assay is intended for use in conjunction with a clinical pretest probability assessment model to exclude pulmonary embolism (PE) and deep venous thrombosis (DVT) in outpatients suspected of PE or DVT. The cut-off value is 0.50 ug/mL FEU.   NT-proBNP     Status: Normal   Result Value Ref Range    NT-proBNP 75 0 - 192 pg/mL   TSH with free T4 reflex     Status: Normal   Result Value Ref Range    TSH 2.33 0.30 - 4.20 uIU/mL   CBC with platelets and differential     Status: None   Result Value Ref Range    WBC Count 9.5 4.0 - 11.0 10e3/uL    RBC Count 4.98 3.80 - 5.20 10e6/uL     Hemoglobin 15.0 11.7 - 15.7 g/dL    Hematocrit 44.0 35.0 - 47.0 %    MCV 88 78 - 100 fL    MCH 30.1 26.5 - 33.0 pg    MCHC 34.1 31.5 - 36.5 g/dL    RDW 12.2 10.0 - 15.0 %    Platelet Count 299 150 - 450 10e3/uL    % Neutrophils 59 %    % Lymphocytes 26 %    % Monocytes 11 %    % Eosinophils 3 %    % Basophils 1 %    % Immature Granulocytes 0 %    NRBCs per 100 WBC 0 <1 /100    Absolute Neutrophils 5.6 1.6 - 8.3 10e3/uL    Absolute Lymphocytes 2.5 0.8 - 5.3 10e3/uL    Absolute Monocytes 1.0 0.0 - 1.3 10e3/uL    Absolute Eosinophils 0.3 0.0 - 0.7 10e3/uL    Absolute Basophils 0.1 0.0 - 0.2 10e3/uL    Absolute Immature Granulocytes 0.0 <=0.4 10e3/uL    Absolute NRBCs 0.0 10e3/uL   Troponin T, High Sensitivity     Status: Normal   Result Value Ref Range    Troponin T, High Sensitivity 7 <=14 ng/L   EKG 12 lead     Status: None   Result Value Ref Range    Systolic Blood Pressure  mmHg    Diastolic Blood Pressure  mmHg    Ventricular Rate 86 BPM    Atrial Rate 86 BPM    CT Interval 154 ms    QRS Duration 102 ms     ms    QTc 464 ms    P Axis 41 degrees    R AXIS -9 degrees    T Axis 18 degrees    Interpretation ECG       Sinus rhythm  Possible Left atrial enlargement  Left ventricular hypertrophy  Cannot rule out Septal infarct , age undetermined  Abnormal ECG  Unconfirmed report - interpretation of this ECG is computer generated - see medical record for final interpretation  Confirmed by - EMERGENCY ROOM, PHYSICIAN ([id number]),  TAYO GARCIA ([id number]) on 6/15/2025 8:32:46 AM     EKG 12 lead     Status: None   Result Value Ref Range    Systolic Blood Pressure  mmHg    Diastolic Blood Pressure  mmHg    Ventricular Rate 87 BPM    Atrial Rate 87 BPM    CT Interval 152 ms    QRS Duration 94 ms     ms    QTc 469 ms    P Axis 48 degrees    R AXIS -8 degrees    T Axis 15 degrees    Interpretation ECG       Sinus rhythm  Minimal voltage criteria for LVH, may be normal variant  Cannot rule out Anterior infarct , age  undetermined  Abnormal ECG  Unconfirmed report - interpretation of this ECG is computer generated - see medical record for final interpretation  Confirmed by - EMERGENCY ROOM, PHYSICIAN ([id number]),  TAYO GARCIA ([id number]) on 6/15/2025 8:32:51 AM     CBC with platelets differential     Status: None    Narrative    The following orders were created for panel order CBC with platelets differential.  Procedure                               Abnormality         Status                     ---------                               -----------         ------                     CBC with platelets and ...[[id number]]                      Final result                 Please view results for these tests on the individual orders.     Medications   aspirin (ASA) chewable tablet 324 mg (324 mg Oral $Given 6/14/25 1919)     Labs Ordered and Resulted from Time of ED Arrival to Time of ED Departure   BASIC METABOLIC PANEL - Normal       Result Value    Sodium 137      Potassium 4.4      Chloride 99      Carbon Dioxide (CO2) 24      Anion Gap 14      Urea Nitrogen 11.9      Creatinine 0.53      GFR Estimate >90      Calcium 9.3      Glucose 79     TROPONIN T, HIGH SENSITIVITY - Normal    Troponin T, High Sensitivity 7     MAGNESIUM - Normal    Magnesium 2.1     D DIMER QUANTITATIVE - Normal    D-Dimer Quantitative <0.27     NT-PROBNP - Normal    NT-proBNP 75     TSH WITH FREE T4 REFLEX - Normal    TSH 2.33     TROPONIN T, HIGH SENSITIVITY - Normal    Troponin T, High Sensitivity 7     CBC WITH PLATELETS AND DIFFERENTIAL    WBC Count 9.5      RBC Count 4.98      Hemoglobin 15.0      Hematocrit 44.0      MCV 88      MCH 30.1      MCHC 34.1      RDW 12.2      Platelet Count 299      % Neutrophils 59      % Lymphocytes 26      % Monocytes 11      % Eosinophils 3      % Basophils 1      % Immature Granulocytes 0      NRBCs per 100 WBC 0      Absolute Neutrophils 5.6      Absolute Lymphocytes 2.5      Absolute Monocytes 1.0      Absolute  Eosinophils 0.3      Absolute Basophils 0.1      Absolute Immature Granulocytes 0.0      Absolute NRBCs 0.0       XR Chest 2 Views   Final Result   IMPRESSION: No change. Negative chest.             Critical care was not performed.     Medical Decision Making  The patient's presentation was of moderate complexity (an acute illness with systemic symptoms).    The patient's evaluation involved:  ordering and/or review of 3+ test(s) in this encounter (see separate area of note for details)    The patient's management necessitated moderate risk (prescription drug management including medications given in the ED).    Assessment & Plan    This patient is a 44-year-old female who presents to the ED today for palpitations.  She states her palpitations have resolved spontaneously but she is more concerned about her high blood pressure now.  She denies any headache, nausea, vomiting, or visual changes.  She does complain of some mild chest discomfort.  Cardiac workup ordered and she will be given aspirin.    Of note her blood pressure did normalize without treatment in the ED.  Her EKG is normal sinus rhythm with no ST-T wave changes.  Labs are unremarkable.  Her symptoms improved with aspirin.  D-dimer was normal as well.  I think she has atypical chest wall pain.  She will be discharged home with recommendation to take ibuprofen as needed and will follow-up with her primary care provider in the next 3 to 5 days for repeat blood pressure check.    I have reviewed the nursing notes. I have reviewed the findings, diagnosis, plan and need for follow up with the patient.    Discharge Medication List as of 6/14/2025 11:21 PM          Final diagnoses:   Palpitations       Fito PATHAK, am serving as a trained medical scribe to document services personally performed by Baldemar Copeland MD based on the provider's statements to me on June 14, 2025.  This document has been checked and approved by the attending provider.    Baldemar PATHAK  Min HEATH, was physically present and have reviewed and verified the accuracy of this note documented by Fito Sharpe, medical scribe.      Baldemar Copeland MD  [facility] EMERGENCY DEPARTMENT  6/14/2025     Baldemar Copeland MD  06/25/25 [id number]

## 2025-06-14 NOTE — ED TRIAGE NOTES
Triage Assessment (Adult)       Row Name 06/14/25 1803          Triage Assessment    Airway WDL WDL        Respiratory WDL    Respiratory WDL WDL        Skin Circulation/Temperature WDL    Skin Circulation/Temperature WDL WDL        Cardiac WDL    Cardiac WDL X  pt reporting intermittent palpitations        Peripheral/Neurovascular WDL    Peripheral Neurovascular WDL X;neurovascular assessment lower        Cognitive/Neuro/Behavioral WDL    Cognitive/Neuro/Behavioral WDL WDL        RLE Neurovascular Assessment    Sensation RLE tingling present

## 2025-06-14 NOTE — ED TRIAGE NOTES
Pt was experiencing heart palpitations and tachycardia around 4pm today. Palpitations are now intermittent, not experiencing any chest pain. Endocrine doctor recently adjusted steroid medication.

## 2025-06-15 LAB
ATRIAL RATE - MUSE: 86 BPM
ATRIAL RATE - MUSE: 87 BPM
DIASTOLIC BLOOD PRESSURE - MUSE: NORMAL MMHG
DIASTOLIC BLOOD PRESSURE - MUSE: NORMAL MMHG
INTERPRETATION ECG - MUSE: NORMAL
INTERPRETATION ECG - MUSE: NORMAL
P AXIS - MUSE: 41 DEGREES
P AXIS - MUSE: 48 DEGREES
PR INTERVAL - MUSE: 152 MS
PR INTERVAL - MUSE: 154 MS
QRS DURATION - MUSE: 102 MS
QRS DURATION - MUSE: 94 MS
QT - MUSE: 388 MS
QT - MUSE: 390 MS
QTC - MUSE: 464 MS
QTC - MUSE: 469 MS
R AXIS - MUSE: -8 DEGREES
R AXIS - MUSE: -9 DEGREES
SYSTOLIC BLOOD PRESSURE - MUSE: NORMAL MMHG
SYSTOLIC BLOOD PRESSURE - MUSE: NORMAL MMHG
T AXIS - MUSE: 15 DEGREES
T AXIS - MUSE: 18 DEGREES
VENTRICULAR RATE- MUSE: 86 BPM
VENTRICULAR RATE- MUSE: 87 BPM

## 2025-06-15 NOTE — DISCHARGE INSTRUCTIONS
Continue your home medications as prescribed.  Your labs and EKG here today are normal.  You have been given referral to cardiology.  Follow-up with cardiology at their next available appointment.

## 2025-06-16 ENCOUNTER — PATIENT OUTREACH (OUTPATIENT)
Dept: CARE COORDINATION | Facility: CLINIC | Age: 45
End: 2025-06-16
Payer: COMMERCIAL

## 2025-06-16 ENCOUNTER — TELEPHONE (OUTPATIENT)
Dept: CARDIOLOGY | Facility: CLINIC | Age: 45
End: 2025-06-16
Payer: COMMERCIAL

## 2025-06-16 NOTE — TELEPHONE ENCOUNTER
This encounter is being sent to inform the clinic that this patient has a referral from Baldemar Copeland for the diagnoses of Palpitations and has requested that this patient be seen within 30 days.  Based on the availability of our provider(s), we are unable to accommodate this request.    Were all sites offered this patient?  Yes    Does scheduling algorithm request to schedule next available?  Patient has been scheduled for the first available opening with  on 7/28 - patient will be out of town and declined sooner openings. Sending for awareness..  We have informed the patient that the clinic will review their referral and reach out if a sooner appointment is medically necessary.

## 2025-06-26 ENCOUNTER — HOSPITAL ENCOUNTER (EMERGENCY)
Facility: CLINIC | Age: 45
End: 2025-06-26
Attending: EMERGENCY MEDICINE
Payer: COMMERCIAL

## 2025-06-26 ENCOUNTER — APPOINTMENT (OUTPATIENT)
Dept: CT IMAGING | Facility: CLINIC | Age: 45
End: 2025-06-26
Attending: EMERGENCY MEDICINE
Payer: COMMERCIAL

## 2025-06-26 ENCOUNTER — TRANSFERRED RECORDS (OUTPATIENT)
Dept: HEALTH INFORMATION MANAGEMENT | Facility: CLINIC | Age: 45
End: 2025-06-26

## 2025-06-26 VITALS
SYSTOLIC BLOOD PRESSURE: 171 MMHG | HEART RATE: 81 BPM | BODY MASS INDEX: 41.95 KG/M2 | TEMPERATURE: 98 F | HEIGHT: 70 IN | DIASTOLIC BLOOD PRESSURE: 94 MMHG | RESPIRATION RATE: 16 BRPM | WEIGHT: 293 LBS | OXYGEN SATURATION: 97 %

## 2025-06-26 DIAGNOSIS — R05.9 COUGH, UNSPECIFIED TYPE: ICD-10-CM

## 2025-06-26 DIAGNOSIS — E89.6 HISTORY OF TOTAL ADRENALECTOMY: ICD-10-CM

## 2025-06-26 DIAGNOSIS — Z79.52 CURRENT CHRONIC USE OF SYSTEMIC STEROIDS: ICD-10-CM

## 2025-06-26 DIAGNOSIS — M54.9 ACUTE RIGHT-SIDED BACK PAIN, UNSPECIFIED BACK LOCATION: ICD-10-CM

## 2025-06-26 DIAGNOSIS — R19.7 DIARRHEA, UNSPECIFIED TYPE: ICD-10-CM

## 2025-06-26 LAB
ALBUMIN SERPL BCG-MCNC: 4.2 G/DL (ref 3.5–5.2)
ALP SERPL-CCNC: 79 U/L (ref 40–150)
ALT SERPL W P-5'-P-CCNC: 18 U/L (ref 0–50)
ANION GAP SERPL CALCULATED.3IONS-SCNC: 13 MMOL/L (ref 7–15)
AST SERPL W P-5'-P-CCNC: 14 U/L (ref 0–45)
ATRIAL RATE - MUSE: 81 BPM
BASOPHILS # BLD AUTO: 0.1 10E3/UL (ref 0–0.2)
BASOPHILS NFR BLD AUTO: 1 %
BILIRUB SERPL-MCNC: 0.7 MG/DL
BUN SERPL-MCNC: 17.6 MG/DL (ref 6–20)
CALCIUM SERPL-MCNC: 9.1 MG/DL (ref 8.8–10.4)
CHLORIDE SERPL-SCNC: 99 MMOL/L (ref 98–107)
CREAT SERPL-MCNC: 0.67 MG/DL (ref 0.51–0.95)
DIASTOLIC BLOOD PRESSURE - MUSE: NORMAL MMHG
EGFRCR SERPLBLD CKD-EPI 2021: >90 ML/MIN/1.73M2
EOSINOPHIL # BLD AUTO: 0.3 10E3/UL (ref 0–0.7)
EOSINOPHIL NFR BLD AUTO: 3 %
ERYTHROCYTE [DISTWIDTH] IN BLOOD BY AUTOMATED COUNT: 12.2 % (ref 10–15)
FLUAV RNA SPEC QL NAA+PROBE: NEGATIVE
FLUBV RNA RESP QL NAA+PROBE: NEGATIVE
GLUCOSE SERPL-MCNC: 120 MG/DL (ref 70–99)
HCG SERPL QL: NEGATIVE
HCO3 SERPL-SCNC: 23 MMOL/L (ref 22–29)
HCT VFR BLD AUTO: 40.8 % (ref 35–47)
HGB BLD-MCNC: 14 G/DL (ref 11.7–15.7)
IMM GRANULOCYTES # BLD: 0.1 10E3/UL
IMM GRANULOCYTES NFR BLD: 0 %
INTERPRETATION ECG - MUSE: NORMAL
LACTATE SERPL-SCNC: 1.2 MMOL/L (ref 0.7–2)
LIPASE SERPL-CCNC: 32 U/L (ref 13–60)
LYMPHOCYTES # BLD AUTO: 3.5 10E3/UL (ref 0.8–5.3)
LYMPHOCYTES NFR BLD AUTO: 29 %
MCH RBC QN AUTO: 30.4 PG (ref 26.5–33)
MCHC RBC AUTO-ENTMCNC: 34.3 G/DL (ref 31.5–36.5)
MCV RBC AUTO: 89 FL (ref 78–100)
MONOCYTES # BLD AUTO: 1.2 10E3/UL (ref 0–1.3)
MONOCYTES NFR BLD AUTO: 10 %
NEUTROPHILS # BLD AUTO: 6.8 10E3/UL (ref 1.6–8.3)
NEUTROPHILS NFR BLD AUTO: 57 %
NRBC # BLD AUTO: 0 10E3/UL
NRBC BLD AUTO-RTO: 0 /100
P AXIS - MUSE: 30 DEGREES
PLATELET # BLD AUTO: 304 10E3/UL (ref 150–450)
POTASSIUM SERPL-SCNC: 3.7 MMOL/L (ref 3.4–5.3)
PR INTERVAL - MUSE: 154 MS
PROT SERPL-MCNC: 7.6 G/DL (ref 6.4–8.3)
QRS DURATION - MUSE: 98 MS
QT - MUSE: 394 MS
QTC - MUSE: 457 MS
R AXIS - MUSE: -9 DEGREES
RBC # BLD AUTO: 4.6 10E6/UL (ref 3.8–5.2)
RSV RNA SPEC NAA+PROBE: NEGATIVE
SARS-COV-2 RNA RESP QL NAA+PROBE: NEGATIVE
SODIUM SERPL-SCNC: 135 MMOL/L (ref 135–145)
SYSTOLIC BLOOD PRESSURE - MUSE: NORMAL MMHG
T AXIS - MUSE: 11 DEGREES
VENTRICULAR RATE- MUSE: 81 BPM
WBC # BLD AUTO: 12 10E3/UL (ref 4–11)

## 2025-06-26 PROCEDURE — 74177 CT ABD & PELVIS W/CONTRAST: CPT

## 2025-06-26 PROCEDURE — 85025 COMPLETE CBC W/AUTO DIFF WBC: CPT | Performed by: EMERGENCY MEDICINE

## 2025-06-26 PROCEDURE — 83605 ASSAY OF LACTIC ACID: CPT | Performed by: EMERGENCY MEDICINE

## 2025-06-26 PROCEDURE — 96374 THER/PROPH/DIAG INJ IV PUSH: CPT | Mod: 59 | Performed by: EMERGENCY MEDICINE

## 2025-06-26 PROCEDURE — 36415 COLL VENOUS BLD VENIPUNCTURE: CPT | Performed by: EMERGENCY MEDICINE

## 2025-06-26 PROCEDURE — 93005 ELECTROCARDIOGRAM TRACING: CPT | Performed by: EMERGENCY MEDICINE

## 2025-06-26 PROCEDURE — 99285 EMERGENCY DEPT VISIT HI MDM: CPT | Mod: 25 | Performed by: EMERGENCY MEDICINE

## 2025-06-26 PROCEDURE — 999N000104 CT LUMBAR SPINE RECONSTRUCTED

## 2025-06-26 PROCEDURE — 93010 ELECTROCARDIOGRAM REPORT: CPT | Performed by: EMERGENCY MEDICINE

## 2025-06-26 PROCEDURE — 250N000011 HC RX IP 250 OP 636: Performed by: EMERGENCY MEDICINE

## 2025-06-26 PROCEDURE — 84703 CHORIONIC GONADOTROPIN ASSAY: CPT | Performed by: EMERGENCY MEDICINE

## 2025-06-26 PROCEDURE — 99284 EMERGENCY DEPT VISIT MOD MDM: CPT | Performed by: EMERGENCY MEDICINE

## 2025-06-26 PROCEDURE — 87637 SARSCOV2&INF A&B&RSV AMP PRB: CPT | Performed by: EMERGENCY MEDICINE

## 2025-06-26 PROCEDURE — 83690 ASSAY OF LIPASE: CPT | Performed by: EMERGENCY MEDICINE

## 2025-06-26 PROCEDURE — 80053 COMPREHEN METABOLIC PANEL: CPT | Performed by: EMERGENCY MEDICINE

## 2025-06-26 PROCEDURE — 250N000009 HC RX 250: Performed by: EMERGENCY MEDICINE

## 2025-06-26 RX ORDER — KETOROLAC TROMETHAMINE 30 MG/ML
30 INJECTION, SOLUTION INTRAMUSCULAR; INTRAVENOUS ONCE
Status: COMPLETED | OUTPATIENT
Start: 2025-06-26 | End: 2025-06-26

## 2025-06-26 RX ORDER — IOPAMIDOL 755 MG/ML
500 INJECTION, SOLUTION INTRAVASCULAR ONCE
Status: COMPLETED | OUTPATIENT
Start: 2025-06-26 | End: 2025-06-26

## 2025-06-26 RX ADMIN — KETOROLAC TROMETHAMINE 30 MG: 30 INJECTION, SOLUTION INTRAMUSCULAR at 23:49

## 2025-06-26 RX ADMIN — IOPAMIDOL 145 ML: 755 INJECTION, SOLUTION INTRAVENOUS at 23:41

## 2025-06-26 RX ADMIN — SODIUM CHLORIDE 72 ML: 9 INJECTION, SOLUTION INTRAVENOUS at 23:41

## 2025-06-26 ASSESSMENT — ACTIVITIES OF DAILY LIVING (ADL)
ADLS_ACUITY_SCORE: 41
ADLS_ACUITY_SCORE: 41

## 2025-06-26 ASSESSMENT — COLUMBIA-SUICIDE SEVERITY RATING SCALE - C-SSRS
2. HAVE YOU ACTUALLY HAD ANY THOUGHTS OF KILLING YOURSELF IN THE PAST MONTH?: NO
6. HAVE YOU EVER DONE ANYTHING, STARTED TO DO ANYTHING, OR PREPARED TO DO ANYTHING TO END YOUR LIFE?: NO
1. IN THE PAST MONTH, HAVE YOU WISHED YOU WERE DEAD OR WISHED YOU COULD GO TO SLEEP AND NOT WAKE UP?: NO

## 2025-06-27 VITALS
DIASTOLIC BLOOD PRESSURE: 76 MMHG | BODY MASS INDEX: 41.95 KG/M2 | HEART RATE: 80 BPM | RESPIRATION RATE: 16 BRPM | WEIGHT: 293 LBS | HEIGHT: 70 IN | SYSTOLIC BLOOD PRESSURE: 124 MMHG | OXYGEN SATURATION: 95 % | TEMPERATURE: 98 F

## 2025-06-27 LAB
ATRIAL RATE - MUSE: 81 BPM
DIASTOLIC BLOOD PRESSURE - MUSE: NORMAL MMHG
INTERPRETATION ECG - MUSE: NORMAL
P AXIS - MUSE: 30 DEGREES
PR INTERVAL - MUSE: 154 MS
QRS DURATION - MUSE: 98 MS
QT - MUSE: 394 MS
QTC - MUSE: 457 MS
R AXIS - MUSE: -9 DEGREES
SYSTOLIC BLOOD PRESSURE - MUSE: NORMAL MMHG
T AXIS - MUSE: 11 DEGREES
VENTRICULAR RATE- MUSE: 81 BPM

## 2025-06-27 ASSESSMENT — ACTIVITIES OF DAILY LIVING (ADL): ADLS_ACUITY_SCORE: 47

## 2025-06-27 NOTE — DISCHARGE INSTRUCTIONS
You have been seen in the emergency department today for your symptoms.  Your blood work today looks normal, your white blood cell count is slightly elevated but this can happen with chronic steroid use.  Your pancreas enzyme, liver function tests are normal.  Your COVID and flu swabs were negative.  The abdominal CT scan did not show any acute problems, and your lumbar spine CT did not show any sign of fractures.  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    It is not clear if your symptoms are related to your history of adrenalectomy and changing your steroid regimen.  Please continue to follow-up with endocrinology for ongoing complaints.  If you continue to have diarrhea, please follow-up with your primary clinic and they can order stool studies for you.    For your back pain, you can alternate Tylenol, ibuprofen, and you can also use lidocaine patches.  Follow-up with your primary clinic if you are not having improvement.

## 2025-06-27 NOTE — ED PROVIDER NOTES
ED Provider Note  [facility]      History     Chief Complaint   Patient presents with    Back Pain    Chest Pain    Cough     HPI  Madyson Downing is a 44 year old female with a complex past medical history who presents to the emergency department today.  Patient reports that she was primary and eventually was placed on hydrocortisone she states that.  Told her she did not happy with her care at male another endocrinologist, has tried to get into endocrinology many of her problems link and low back denies any falls injury or trauma no radiation of pain denies any hematuria or disc off that she has had her 2-week she has not no chest pain but not right now.  Minimal pain which is somewhat transient and tends to occur in different parts of the abdomen, this has been going on for weeks.  She is status post a cholecystectomy as well as  x 2.    Patient is endorsing some occasional bilateral shoulder pain as well.  She was over at the [facility] emergency department the department for her son and decided to come to the emergency department here due to her symptoms.    Patient reports that her clinic has suggested that she be referred to the rare diseases clinic at the [facility].  There is some question as to whether or not her son may have adrenoleukodystrophy.      Past Medical History  Past Medical History:   Diagnosis Date    Family history of diabetes mellitus (DM)     father and 2 sisters    Hypertension 2010    Need help finding tx options    Hypothyroid 10/2003     Past Surgical History:   Procedure Laterality Date    C ANESTH, SECTION      x2    COLONOSCOPY  2006    EYE SURGERY Right     Strabismus correction    GYN SURGERY   and     C-sections    HC REMOVAL GALLBLADDER      SURGICAL HISTORY OF -       eye surgery for lazy eye     chlorthalidone (HYGROTON) 25 MG tablet  hydrocortisone (CORTEF) 10 MG tablet  hydrocortisone (CORTEF) 5 MG  "tablet  levothyroxine (SYNTHROID/LEVOTHROID) 88 MCG tablet  MULTIPLE VITAMINS-CALCIUM PO  QNASL 80 MCG/ACT Nasal Spray      Allergies   Allergen Reactions    Amlodipine Anaphylaxis    Ace Inhibitors Angioedema and Cough     Other Reaction(s): Cough, Not available    Food      PN: LW Other1: -Gluten Sensitivity    Gluten Meal Nausea and Vomiting    Lisinopril Angioedema     Intestinal angioedema     Losartan Nausea and GI Disturbance     Family History  Family History   Problem Relation Age of Onset    Lipids Father     Diabetes Father         Type 2    Hypertension Father     Hyperlipidemia Father     Mental Illness Father         Bipolar Disorder    Cerebrovascular Disease Mother     C.A.D. Mother     Hypertension Mother     Depression Mother     Obesity Mother     Diabetes Mother     Diabetes Sister         Type 2    Diabetes Sister         Type 2    Diabetes Sister     Hypertension Sister     Diabetes Sister     Hypertension Sister     Thyroid Disease Sister     Obesity Sister     Hypertension Paternal Grandfather     Prostate Cancer Paternal Grandfather     Other Cancer Maternal Grandmother         Leukemia (Childhood Leukemia, reportedly linked to too many antibiotics)    Thyroid Disease Maternal Grandmother     Anesthesia Reaction Son     Asthma Son     Asthma Daughter     Thyroid Disease Sister      Social History   Social History     Tobacco Use    Smoking status: Never    Smokeless tobacco: Never   Substance Use Topics    Alcohol use: Yes     Comment: Rarely, only at holiday gatherings with in-laws    Drug use: No      A medically appropriate review of systems was performed with pertinent positives and negatives noted in the HPI, and all other systems negative.    Physical Exam   BP: (!) 171/94  Pulse: 81  Temp: 98  F (36.7  C)  Resp: 16  Height: 177.8 cm (5' 10\")  Weight: 136 kg (299 lb 12.8 oz)  SpO2: 97 %  Physical Exam  Vitals and nursing note reviewed.   Constitutional:       General: She is not in " "acute distress.     Appearance: She is not diaphoretic.      Comments: Obese adult female, lying back in bed, alert, cooperative, appears uncomfortable   HENT:      Head: Atraumatic.      Mouth/Throat:      Mouth: Mucous membranes are moist.      Pharynx: Oropharynx is clear. No oropharyngeal exudate.   Eyes:      General: No scleral icterus.     Pupils: Pupils are equal, round, and reactive to light.   Cardiovascular:      Rate and Rhythm: Normal rate.      Pulses: Normal pulses.      Heart sounds: Normal heart sounds. No murmur heard.  Pulmonary:      Effort: No respiratory distress.      Breath sounds: Normal breath sounds.      Comments: Frequent dry cough, no wheezing or rhonchi noted  Abdominal:      Palpations: Abdomen is soft.      Tenderness: There is abdominal tenderness. There is no guarding or rebound.      Comments: Abdomen is obese, soft, mildly diffusely tender to palpation, hypoactive bowel sounds   Musculoskeletal:         General: Tenderness present.      Comments: Tender to palpation in mid and lower lumbar region as well as over right paraspinous region of the lumbar spine   Skin:     General: Skin is warm.      Findings: No rash.   Neurological:      Mental Status: She is alert.      Comments: Plantarflexion and dorsiflexion intact bilaterally, sensation intact light touch       ***    ED Course, Procedures, & Data      Procedures       EKG Interpretation:      Interpreted by Mariana Manriquez MD  Time reviewed:2230   Symptoms at time of EKG: chest pain   Rhythm: Normal sinus   Rate: Normal  Axis: Normal  Ectopy: None  Conduction: Normal  ST Segments/ T Waves: No ST-T wave changes and No acute ischemic changes  Q Waves: None  Comparison to prior: Unchanged from 6/14/25    Clinical Impression: no acute changes         {ED Course Selections (Optional):079663}  {ED Sepsis CMS Documentation (Optional):314821::\" \"}       Results for orders placed or performed during the hospital encounter of " 06/26/25   EKG 12 lead   Result Value Ref Range    Systolic Blood Pressure  mmHg    Diastolic Blood Pressure  mmHg    Ventricular Rate 81 BPM    Atrial Rate 81 BPM    SC Interval 154 ms    QRS Duration 98 ms     ms    QTc 457 ms    P Axis 30 degrees    R AXIS -9 degrees    T Axis 11 degrees    Interpretation ECG       Sinus rhythm  Moderate voltage criteria for LVH, may be normal variant  Borderline ECG       Medications - No data to display       {Critical Care Performed?:579356}    Assessment & Plan    Patient presents the above complaints.  On my evaluation patient is alert, cooperative, no acute distress.  She is hypertensive with a blood pressure 171/94.  Otherwise vital signs are normal.  She does have tenderness to palpation over the right flank, as well as in the midline of the lumbar spine.    With regard to her conglomeration of symptoms, I think that it is unlikely that the emergency department is going to be able to identify a unifying diagnosis which would explain all of her symptoms.  I did explain this to her and she seems to understand this.  I did tell her that we could evaluate for new or acute problem given the fact that she reports her symptoms are so bothersome for her.  We did establish IV access and we did draw blood for laboratory analysis.  CBC***, CMP***, lipase***, lactate***.  hCG**.  Due to her diarrhea we did check a C. difficile CL as well as focused enteric and viral panel.  These are pending.  UA***, hCG***, EKG shows normal sinus rhythm with a rate of 81, I see no sign of ectopy or ischemia.  We did elect to do an abdominal CT scan given her vague abdominal symptoms, this is***.  We also did do a lumbar spine recon on the CT due to her back pain, given chronic steroid use need to consider the possibility of insufficiency fracture (though the fact that she has been on steroids for only 3 months makes this a little bit less likely), L-spine CT shows***.  Again patient had had  already gotten a chest CT scan 3 days ago from urgent care which was unremarkable.    I have reviewed the nursing notes. I have reviewed the findings, diagnosis, plan and need for follow up with the patient.    New Prescriptions    No medications on file       Final diagnoses:   None       Mariana Manriquez MD  [facility] EMERGENCY DEPARTMENT  6/26/2025   injection 30 mg (30 mg Intravenous $Given 6/26/25 5211)          Critical care was not performed.     Medical Decision Making  The patient's presentation was of high complexity (a chronic illness severe exacerbation, progression, or side effect of treatment).    The patient's evaluation involved:  review of external note(s) from 3+ sources (see separate area of note for details)  review of 3+ test result(s) ordered prior to this encounter (see separate area of note for details)  ordering and/or review of 3+ test(s) in this encounter (see separate area of note for details)    The patient's management necessitated moderate risk (prescription drug management including medications given in the ED).    Assessment & Plan    Patient presents the above complaints.  On my evaluation patient is alert, cooperative, no acute distress.  She is hypertensive with a blood pressure 171/94.  Otherwise vital signs are normal.  She does have tenderness to palpation over the right flank, as well as in the midline of the lumbar spine.    With regard to her conglomeration of symptoms, I think that it is unlikely that the emergency department is going to be able to identify a unifying diagnosis which would explain all of her symptoms.  I did explain this to her and she seems to understand this.  I did tell her that we could evaluate for new or acute problem given the fact that she reports her symptoms are so bothersome for her.  We did establish IV access and we did draw blood for laboratory analysis.  CBC shows mild leukocytosis with a white count of 12, otherwise within normal limits.  Leukocytosis could potentially be due to steroid use, CMP normal limits with exception of glucose of 120, importantly sodium and potassium are both normal, lipase within normal limits, lactate within normal limits.  hCG is negative.  Due to her diarrhea we did check a C. difficile as well as focused enteric and viral panel.  Unfortunately patient was not  able to provide a sample.   EKG shows normal sinus rhythm with a rate of 81, I see no sign of ectopy or ischemia.  We did elect to do an abdominal CT scan given her vague abdominal symptoms, this is read by radiology as no acute pathology in the abdomen and pelvis.  We also did do a lumbar spine recon on the CT due to her back pain, given chronic steroid use need to consider the possibility of insufficiency fracture (though the fact that she has been on steroids for only 3 months makes this a little bit less likely), L-spine CT shows no acute process, no significant canal compromise or neural foraminal narrowing throughout the lumbar spine.  There is mild diffuse degenerative disc disease seen throughout the lumbar spine and mild facet arthropathy from L3 to the sacrum.  Again patient had had already gotten a chest CT scan 3 days ago from urgent care which was unremarkable.    Patient was given a dose of Toradol here in the ED for her symptoms.  I did explain that her workup here in the ED is normal.  I did reassure her that she does not appear to have any acute pathology at present, but certainly did try to empathize with her situation of having difficulty with her current endocrinology clinic and having trouble finding another endocrinology clinic that is able to manage her.  I encouraged her to continue to follow-up with her clinics in an attempt to try to figure out her symptoms.  She can use Tylenol, ibuprofen, and lidocaine patches to help with her back pain.  Patient verbalizes understanding    I have reviewed the nursing notes. I have reviewed the findings, diagnosis, plan and need for follow up with the patient.    New Prescriptions    No medications on file       Final diagnoses:   Acute right-sided back pain, unspecified back location   Cough, unspecified type   Diarrhea, unspecified type   Current chronic use of systemic steroids   History of total adrenalectomy - left       MD LEWIS Velasquez  formerly [facility] EMERGENCY DEPARTMENT  6/26/2025     Mariana Manriquez MD  06/27/25 0100

## 2025-06-27 NOTE — ED TRIAGE NOTES
Pt here body aches, coughing, diarrhea, chest pain, back pain, and bilateral shoulder pain.    Pt has not been feeling well for a while-2 weeks.  She was seen about 2 weeks ago and has progressively gotten worse.  Pt was previously on Dexamethasone for 4 days.  Pt was also on prednisone 4mg.    Pain currently is 7/10 mostly in the back.  Does have occasional dizziness/lightheadedness.

## 2025-07-09 ENCOUNTER — TRANSFERRED RECORDS (OUTPATIENT)
Dept: HEALTH INFORMATION MANAGEMENT | Facility: CLINIC | Age: 45
End: 2025-07-09
Payer: COMMERCIAL

## 2025-07-09 LAB
ALT SERPL-CCNC: 19 U/L (ref 6–29)
AST SERPL-CCNC: 20 U/L (ref 10–35)
CREATININE (EXTERNAL): 0.52 MG/DL (ref 0.5–0.99)
GFR ESTIMATED (EXTERNAL): 116 ML/MIN/1.73M2
GLUCOSE (EXTERNAL): 111 MG/DL (ref 65–99)
POTASSIUM (EXTERNAL): 4.1 MMOL/L (ref 3.5–5.3)

## 2025-07-10 ENCOUNTER — HOSPITAL ENCOUNTER (EMERGENCY)
Facility: CLINIC | Age: 45
Discharge: HOME | End: 2025-07-10
Attending: EMERGENCY MEDICINE
Payer: COMMERCIAL

## 2025-07-10 VITALS
TEMPERATURE: 98.4 F | RESPIRATION RATE: 20 BRPM | SYSTOLIC BLOOD PRESSURE: 141 MMHG | HEART RATE: 101 BPM | WEIGHT: 293 LBS | DIASTOLIC BLOOD PRESSURE: 75 MMHG | BODY MASS INDEX: 41.95 KG/M2 | HEIGHT: 70 IN | OXYGEN SATURATION: 94 %

## 2025-07-10 DIAGNOSIS — U07.1 COVID-19 VIRUS INFECTION: ICD-10-CM

## 2025-07-10 LAB
FLUAV RNA SPEC QL NAA+PROBE: NEGATIVE
FLUBV RNA RESP QL NAA+PROBE: NEGATIVE
RSV RNA SPEC NAA+PROBE: NEGATIVE
SARS-COV-2 RNA RESP QL NAA+PROBE: POSITIVE

## 2025-07-10 PROCEDURE — 87637 SARSCOV2&INF A&B&RSV AMP PRB: CPT | Performed by: EMERGENCY MEDICINE

## 2025-07-10 PROCEDURE — 99284 EMERGENCY DEPT VISIT MOD MDM: CPT | Performed by: EMERGENCY MEDICINE

## 2025-07-10 PROCEDURE — 99283 EMERGENCY DEPT VISIT LOW MDM: CPT | Performed by: EMERGENCY MEDICINE

## 2025-07-10 RX ORDER — PREDNISONE 1 MG/1
8 TABLET ORAL DAILY
COMMUNITY

## 2025-07-10 RX ORDER — CETIRIZINE HYDROCHLORIDE 10 MG/1
10 TABLET ORAL DAILY
COMMUNITY

## 2025-07-10 RX ORDER — FLUTICASONE PROPIONATE AND SALMETEROL 250; 50 UG/1; UG/1
1 POWDER RESPIRATORY (INHALATION) EVERY 12 HOURS
COMMUNITY

## 2025-07-10 RX ORDER — MONTELUKAST SODIUM 10 MG/1
10 TABLET ORAL AT BEDTIME
COMMUNITY

## 2025-07-10 RX ORDER — FLUCONAZOLE 100 MG/1
200 TABLET ORAL DAILY
COMMUNITY

## 2025-07-10 ASSESSMENT — ACTIVITIES OF DAILY LIVING (ADL): ADLS_ACUITY_SCORE: 41

## 2025-07-10 NOTE — ED PROVIDER NOTES
ED Provider Note  [facility]      History     Chief Complaint   Patient presents with    Allergic Reaction     Sulindac took four doses not sure if allergic reaction or COVID     HPI  Madyson Downing is a 45 year old female with a complex past medical history who presents to the ED today with a complaint of 1 day of runny nose and dry cough.  She reports that 2 of her family members currently have COVID, 1 developed symptom onset on , 1 on .  She states she tested herself on  even though she was not having symptoms, tested negative.  She states that she had been having some pleuritic chest pain though, had a chest CT which was unremarkable, showing no PE, was started on sulindac for pleurisy.  She has been on this since around 2025.  She states that things were fine until yesterday when she developed profusely runny nose and sneezing.  She subsequently developed dry cough.  She states that she has had a lot of drug reactions, looked at the medication profile, and question whether or not she might be reacting to the medication.  She called the nurse line and was directed here for further evaluation.  She also notes that she started peeling on her back.  She thought it was related to sunburn, but was not under percent sure, so that should mention it.  There is no itching.  No tightness or scratching in her throat, no sore throat.  No trouble breathing.  He states she has started stress dose steroids.    Past Medical History  Past Medical History:   Diagnosis Date    Family history of diabetes mellitus (DM)     father and 2 sisters    Hypertension 2010    Need help finding tx options    Hypothyroid 10/2003     Past Surgical History:   Procedure Laterality Date    C ANESTH, SECTION      x2    COLONOSCOPY  2006    EYE SURGERY Right     Strabismus correction    GYN SURGERY   and     C-sections    HC REMOVAL GALLBLADDER      SURGICAL HISTORY OF -    1985    eye surgery for lazy eye     cetirizine (ZYRTEC) 10 MG tablet  chlorthalidone (HYGROTON) 25 MG tablet  fluconazole (DIFLUCAN) 100 MG tablet  fluticasone-salmeterol (ADVAIR) 250-50 MCG/ACT inhaler  levothyroxine (SYNTHROID/LEVOTHROID) 88 MCG tablet  montelukast (SINGULAIR) 10 MG tablet  MULTIPLE VITAMINS-CALCIUM PO  predniSONE (DELTASONE) 1 MG tablet  QNASL 80 MCG/ACT Nasal Spray  hydrocortisone (CORTEF) 10 MG tablet  hydrocortisone (CORTEF) 5 MG tablet  nirmatrelvir and ritonavir (PAXLOVID) 300 mg/100 mg therapy pack      Allergies   Allergen Reactions    Amlodipine Anaphylaxis    Ace Inhibitors Angioedema and Cough     Other Reaction(s): Cough, Not available    Food      PN: LW Other1: -Gluten Sensitivity    Gluten Meal Nausea and Vomiting    Lisinopril Angioedema     Intestinal angioedema     Losartan Nausea and GI Disturbance     Family History  Family History   Problem Relation Age of Onset    Lipids Father     Diabetes Father         Type 2    Hypertension Father     Hyperlipidemia Father     Mental Illness Father         Bipolar Disorder    Cerebrovascular Disease Mother     C.A.D. Mother     Hypertension Mother     Depression Mother     Obesity Mother     Diabetes Mother     Diabetes Sister         Type 2    Diabetes Sister         Type 2    Diabetes Sister     Hypertension Sister     Diabetes Sister     Hypertension Sister     Thyroid Disease Sister     Obesity Sister     Hypertension Paternal Grandfather     Prostate Cancer Paternal Grandfather     Other Cancer Maternal Grandmother         Leukemia (Childhood Leukemia, reportedly linked to too many antibiotics)    Thyroid Disease Maternal Grandmother     Anesthesia Reaction Son     Asthma Son     Asthma Daughter     Thyroid Disease Sister      Social History   Social History     Tobacco Use    Smoking status: Never    Smokeless tobacco: Never   Substance Use Topics    Alcohol use: Yes     Comment: Rarely, only at holiday gatherings with in-laws     "Drug use: No      A medically appropriate review of systems was performed with pertinent positives and negatives noted in the HPI, and all other systems negative.    Physical Exam   BP: (!) 166/109  Pulse: 101  Temp: 98.4  F (36.9  C)  Resp: 18  Height: 177.8 cm (5' 10\")  Weight: 133.8 kg (295 lb)  SpO2: 96 %  Physical Exam  Constitutional:       General: She is not in acute distress.     Appearance: Normal appearance. She is not toxic-appearing.   HENT:      Head: Atraumatic.      Mouth/Throat:      Mouth: Mucous membranes are moist.      Pharynx: Oropharynx is clear.   Eyes:      General: No scleral icterus.     Conjunctiva/sclera: Conjunctivae normal.   Cardiovascular:      Rate and Rhythm: Normal rate.      Heart sounds: Normal heart sounds.   Pulmonary:      Effort: No respiratory distress.      Breath sounds: Normal breath sounds.   Abdominal:      Palpations: Abdomen is soft.      Tenderness: There is no abdominal tenderness.   Musculoskeletal:         General: No deformity.      Cervical back: Neck supple.   Skin:     General: Skin is warm.      Findings: No rash.      Comments: Sunburn on back with some peeling skin   Neurological:      Mental Status: She is alert.           ED Course, Procedures, & Data      Procedures              Results for orders placed or performed during the hospital encounter of 07/10/25   Influenza A/B, RSV and SARS-CoV2 PCR (COVID-19) Nose    Specimen: Nose; Swab   Result Value Ref Range    Influenza A PCR Negative Negative    Influenza B PCR Negative Negative    RSV PCR Negative Negative    SARS CoV2 PCR Positive (A) Negative     Medications - No data to display       Critical care was not performed.     Medical Decision Making  The patient's presentation was of moderate complexity (an acute illness with systemic symptoms).    The patient's evaluation involved:  review of external note(s) from 1 sources (previous note)  ordering and/or review of 1 test(s) in this encounter (see " separate area of note for details)    The patient's management necessitated moderate risk (prescription drug management including medications given in the ED).    Assessment & Plan    COVID did come back positive.  I think this is the cause of her symptoms.  I do not think she is having allergic reaction.  I did review her medications, and salmeterol should not be taken in conjunction with Paxlovid.  I do think she should take Paxlovid given her complex medical history.  The patient feels comfortable with holding the's albuterol and starting the Paxlovid as soon as possible.  Recent creatinine was normal.  She is encouraged to return with shortness of breath or any other concerns.  Isolation recommendations were discussed.  She does not have evidence of severe disease at this time.    Dictation Disclaimer: Some of this Note has been completed with voice-recognition dictation software. Although errors are generally corrected real-time, there is the potential for a rare error to be present in the completed chart.      I have reviewed the nursing notes. I have reviewed the findings, diagnosis, plan and need for follow up with the patient.    New Prescriptions    NIRMATRELVIR AND RITONAVIR (PAXLOVID) 300 MG/100 MG THERAPY PACK    Take 3 tablets by mouth 2 times daily for 5 days.       Final diagnoses:   COVID-19 virus infection       Camilla Diehl  [facility] EMERGENCY DEPARTMENT  7/10/2025     Camilla Diehl MD  07/10/25 0649

## 2025-07-10 NOTE — DISCHARGE INSTRUCTIONS
Start the Paxlovid as soon as possible. Do not take your Advair while taking this. Return with shortness of breath or any other concerns. Isolate until feeling notable improvement.

## 2025-07-10 NOTE — ED TRIAGE NOTES
Pt states is having allergic reaction to new med or has COVID.  Pt reports that  and son have COVID.  Pt has cough, nasal congestion and drainage.  Pt tested negative on July 7th.  Started new med  on July 7th in the evening.     Triage Assessment (Adult)       Row Name 07/10/25 0519          Triage Assessment    Airway WDL WDL        Respiratory WDL    Respiratory WDL X  short of breath        Cardiac WDL    Cardiac WDL WDL        Peripheral/Neurovascular WDL    Peripheral Neurovascular WDL WDL        Cognitive/Neuro/Behavioral WDL    Cognitive/Neuro/Behavioral WDL WDL        [place] Coma Scale    Best Eye Response 4-->(E4) spontaneous     Best Motor Response 6-->(M6) obeys commands     Best Verbal Response 5-->(V5) oriented     [place] Coma Scale Score 15

## 2025-07-16 ENCOUNTER — TRANSFERRED RECORDS (OUTPATIENT)
Dept: HEALTH INFORMATION MANAGEMENT | Facility: CLINIC | Age: 45
End: 2025-07-16
Payer: COMMERCIAL

## 2025-07-16 ENCOUNTER — MEDICAL CORRESPONDENCE (OUTPATIENT)
Dept: HEALTH INFORMATION MANAGEMENT | Facility: CLINIC | Age: 45
End: 2025-07-16
Payer: COMMERCIAL

## 2025-07-17 ENCOUNTER — TRANSCRIBE ORDERS (OUTPATIENT)
Dept: OTHER | Age: 45
End: 2025-07-17

## 2025-07-17 DIAGNOSIS — R20.0 NUMBNESS AND TINGLING OF BOTH LEGS BELOW KNEES: ICD-10-CM

## 2025-07-17 DIAGNOSIS — R20.2 NUMBNESS AND TINGLING OF BOTH LEGS BELOW KNEES: ICD-10-CM

## 2025-07-17 DIAGNOSIS — R26.89 BALANCE PROBLEM: Primary | ICD-10-CM

## 2025-07-17 DIAGNOSIS — R20.0 ARM NUMBNESS: ICD-10-CM

## 2025-07-22 ENCOUNTER — OFFICE VISIT (OUTPATIENT)
Dept: RHEUMATOLOGY | Facility: CLINIC | Age: 45
End: 2025-07-22
Attending: PSYCHIATRY & NEUROLOGY
Payer: COMMERCIAL

## 2025-07-22 ENCOUNTER — HOSPITAL ENCOUNTER (OUTPATIENT)
Dept: GENERAL RADIOLOGY | Facility: HOSPITAL | Age: 45
Discharge: HOME | End: 2025-07-22
Attending: INTERNAL MEDICINE
Payer: COMMERCIAL

## 2025-07-22 VITALS
DIASTOLIC BLOOD PRESSURE: 74 MMHG | HEART RATE: 97 BPM | OXYGEN SATURATION: 98 % | SYSTOLIC BLOOD PRESSURE: 138 MMHG | WEIGHT: 293 LBS | BODY MASS INDEX: 44.04 KG/M2

## 2025-07-22 DIAGNOSIS — R76.8 ANA POSITIVE: Primary | ICD-10-CM

## 2025-07-22 DIAGNOSIS — M25.50 POLYARTHRALGIA: ICD-10-CM

## 2025-07-22 LAB
ALBUMIN SERPL BCG-MCNC: 4.2 G/DL (ref 3.5–5.2)
ALT SERPL W P-5'-P-CCNC: 24 U/L (ref 0–50)
BASOPHILS # BLD AUTO: 0.1 10E3/UL (ref 0–0.2)
BASOPHILS NFR BLD AUTO: 1 %
CREAT SERPL-MCNC: 0.57 MG/DL (ref 0.51–0.95)
CRP SERPL-MCNC: 4.13 MG/L
EGFRCR SERPLBLD CKD-EPI 2021: >90 ML/MIN/1.73M2
EOSINOPHIL # BLD AUTO: 0.2 10E3/UL (ref 0–0.7)
EOSINOPHIL NFR BLD AUTO: 2 %
ERYTHROCYTE [DISTWIDTH] IN BLOOD BY AUTOMATED COUNT: 11.8 % (ref 10–15)
ERYTHROCYTE [SEDIMENTATION RATE] IN BLOOD BY WESTERGREN METHOD: 23 MM/HR (ref 0–20)
HCT VFR BLD AUTO: 40.8 % (ref 35–47)
HCV AB SERPL QL IA: NONREACTIVE
HGB BLD-MCNC: 13.9 G/DL (ref 11.7–15.7)
IMM GRANULOCYTES # BLD: 0 10E3/UL
IMM GRANULOCYTES NFR BLD: 0 %
LYMPHOCYTES # BLD AUTO: 1.6 10E3/UL (ref 0.8–5.3)
LYMPHOCYTES NFR BLD AUTO: 18 %
MCH RBC QN AUTO: 31 PG (ref 26.5–33)
MCHC RBC AUTO-ENTMCNC: 34.1 G/DL (ref 31.5–36.5)
MCV RBC AUTO: 91 FL (ref 78–100)
MONOCYTES # BLD AUTO: 0.7 10E3/UL (ref 0–1.3)
MONOCYTES NFR BLD AUTO: 8 %
NEUTROPHILS # BLD AUTO: 6.4 10E3/UL (ref 1.6–8.3)
NEUTROPHILS NFR BLD AUTO: 71 %
PLATELET # BLD AUTO: 291 10E3/UL (ref 150–450)
RBC # BLD AUTO: 4.49 10E6/UL (ref 3.8–5.2)
RHEUMATOID FACT SERPL-ACNC: <10 IU/ML
WBC # BLD AUTO: 9 10E3/UL (ref 4–11)

## 2025-07-22 PROCEDURE — 86431 RHEUMATOID FACTOR QUANT: CPT | Performed by: INTERNAL MEDICINE

## 2025-07-22 PROCEDURE — 36415 COLL VENOUS BLD VENIPUNCTURE: CPT | Performed by: INTERNAL MEDICINE

## 2025-07-22 PROCEDURE — 84460 ALANINE AMINO (ALT) (SGPT): CPT | Performed by: INTERNAL MEDICINE

## 2025-07-22 PROCEDURE — G2211 COMPLEX E/M VISIT ADD ON: HCPCS | Performed by: INTERNAL MEDICINE

## 2025-07-22 PROCEDURE — 3075F SYST BP GE 130 - 139MM HG: CPT | Performed by: INTERNAL MEDICINE

## 2025-07-22 PROCEDURE — 86200 CCP ANTIBODY: CPT | Performed by: INTERNAL MEDICINE

## 2025-07-22 PROCEDURE — 73130 X-RAY EXAM OF HAND: CPT | Mod: 50

## 2025-07-22 PROCEDURE — 85025 COMPLETE CBC W/AUTO DIFF WBC: CPT | Performed by: INTERNAL MEDICINE

## 2025-07-22 PROCEDURE — 86803 HEPATITIS C AB TEST: CPT | Performed by: INTERNAL MEDICINE

## 2025-07-22 PROCEDURE — 86140 C-REACTIVE PROTEIN: CPT | Performed by: INTERNAL MEDICINE

## 2025-07-22 PROCEDURE — 82040 ASSAY OF SERUM ALBUMIN: CPT | Performed by: INTERNAL MEDICINE

## 2025-07-22 PROCEDURE — 3078F DIAST BP <80 MM HG: CPT | Performed by: INTERNAL MEDICINE

## 2025-07-22 PROCEDURE — 86235 NUCLEAR ANTIGEN ANTIBODY: CPT | Performed by: INTERNAL MEDICINE

## 2025-07-22 PROCEDURE — 85652 RBC SED RATE AUTOMATED: CPT | Performed by: INTERNAL MEDICINE

## 2025-07-22 PROCEDURE — 99204 OFFICE O/P NEW MOD 45 MIN: CPT | Performed by: INTERNAL MEDICINE

## 2025-07-22 PROCEDURE — 82565 ASSAY OF CREATININE: CPT | Performed by: INTERNAL MEDICINE

## 2025-07-22 PROCEDURE — 86160 COMPLEMENT ANTIGEN: CPT | Performed by: INTERNAL MEDICINE

## 2025-07-22 PROCEDURE — 73560 X-RAY EXAM OF KNEE 1 OR 2: CPT | Mod: 50

## 2025-07-22 PROCEDURE — 73030 X-RAY EXAM OF SHOULDER: CPT | Mod: 50

## 2025-07-22 PROCEDURE — 86225 DNA ANTIBODY NATIVE: CPT | Performed by: INTERNAL MEDICINE

## 2025-07-22 RX ORDER — ACETAMINOPHEN 500 MG
1000 TABLET ORAL
COMMUNITY
Start: 2025-03-26

## 2025-07-22 RX ORDER — FAMOTIDINE 10 MG
10 TABLET ORAL PRN
COMMUNITY

## 2025-07-22 RX ORDER — SULINDAC 200 MG/1
200 TABLET ORAL 2 TIMES DAILY
COMMUNITY
Start: 2025-07-07

## 2025-07-22 RX ORDER — HYDROCHLOROTHIAZIDE 12.5 MG/1
CAPSULE ORAL
COMMUNITY
Start: 2025-06-23

## 2025-07-22 RX ORDER — ALBUTEROL SULFATE 0.83 MG/ML
SOLUTION RESPIRATORY (INHALATION)
COMMUNITY
Start: 2025-07-12

## 2025-07-22 NOTE — PROGRESS NOTES
.  This document was created using a software with less than 100% fidelity, at times resulting in unintended, even erroneous syntax and grammar.  The reader is advised to keep this under consideration while reviewing, interpreting this note.      Rheumatology Consult Note      Madyson Downing     YOB: 1980 Age: 45 year old    Date of visit: 7/22/25    [facility] [place]    Chief Complaint   Patient presents with:  Consult      Assessment and Plan     Madyson was seen today for consult.    Diagnoses and all orders for this visit:    HENRI positive  -     XR Hand Bilateral G/E 3 Views  -     XR Knee AP/Lat Standing Bilateral  -     PARVEEN antibody panel  -     Erythrocyte sedimentation rate auto  -     Hepatitis C antibody  -     Rheumatoid factor  -     Cyclic Citrullinated Peptide Antibody IgG  -     DNA double stranded antibodies  -     CRP inflammation  -     Complement C4  -     Complement C3  -     Creatinine  -     Albumin level  -     ALT  -     CBC with Platelets & Differential  -     XR Shoulder Bilateral 3 Views    Polyarthralgia  -     XR Hand Bilateral G/E 3 Views  -     XR Knee AP/Lat Standing Bilateral  -     PARVEEN antibody panel  -     Erythrocyte sedimentation rate auto  -     Hepatitis C antibody  -     Rheumatoid factor  -     Cyclic Citrullinated Peptide Antibody IgG  -     DNA double stranded antibodies  -     CRP inflammation  -     Complement C4  -     Complement C3  -     Creatinine  -     Albumin level  -     ALT  -     CBC with Platelets & Differential  -     XR Shoulder Bilateral 3 Views    Other orders  -     Adult Rheumatology  Referral           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  She reports that this was first discovered when she was 24 and has had more than 1 evaluations at rheumatology offices where she was informed that this may be part of Hashimoto's  "thyroiditis without evidence of connective tissue disease such as SLE.  This came up again when she had fatigue.  She has had numbness and tingling affecting her extremities.  She is under workup at the [place] [facility].  She has recently had adrenalectomy unilateral for primary aldosteronism.  She is on prednisone 4 mg daily as replacement.  She has photosensitivity.  She has history of mouth ulcers.  She feels those are associated with \"yeast infection\".  Apart from these observations there is little to suggest that she has active connective tissue disease.  However given the complexity of her symptoms it will be important to continue to make observations going forward and further workup as noted.  Will get together in the next few weeks once these data are available.  Today I spent quite some time with her outlining to her what HENRI stands for.  We discussed that how a serologic abnormality does not automatically equate to a clinical condition/syndrome.  She is going to continue her workup at neurology clinic.  Once today's data are available further course to be charted.  We talked about management of bursitis that she has likely adding to her trochanteric region pain.  Will revisit this once the lab data and x-rays are become available.  X-rays of the knees taken today, personally reviewed the films, mild joint space reduction in the medial compartment intact in lateral as well as patellofemoral no chondrocalcinosis.    Return to clinic: 6 weeks      HPI   Madyson Downing is a 45 year old female  is seen today for evaluation of positive HENRI.  She reports that these were drawn in the context of \"episodes/flares\" of variety of symptoms that have troubled her intermittently over the past several years.  She remembers that the first time she had an abnormal HENRI investigated was when she was age 24.  Subsequently at least 2 other occasions this was looked into.  It was thought that it was her Hashimoto's " "thyroiditis which was positive for autoantibodies that was associated with a positive HENRI and that she did not have connective tissue disease.  By her \"episodes\" she means that after a respiratory tract infection/strep throat she gets to be unwell, generalized achiness especially elbows distal and knees distal, she gets \"rash she with sun sensitivity.  This set of symptoms can last for variable time usually counted in months.  At each occasion she has had spontaneous resolution.  In between those episodes there has been no significant issues.  More recently she has experienced worsening pain in her hands, wrists elbows knees ankles and feet.  This has followed her adrenalectomy for primary hyperaldosteronism on 3/25/2025 done at the [facility].  She has 1 adrenal gland residual.  Initially she was given hydrocortisone and now she is on prednisone 4 mg.  She feels that with this her joint symptoms have improved..  She continues to be troubled by back pain by which she means thoracolumbar area, trochanteric regions.  She has mild discomfort in her shoulders especially on the left side.  She feels that overall the pain level be 7 out of 10.  This interferes with day-to-day activities.  She is stiff all over about 90 minutes first thing in the morning.  She reports that recently she had contracted COVID.  She feels that because of her \"high IgA level\" she does not contract infections easily.  Subsequent to the COVID infection she feels that she has \"pneumonia\" she feels that it is bilateral.  However more so on the right side now than the left.  She has chest pain.  She was given sulindac for this.  She has not been given antibiotics.  Her chest x-ray on more than 1 occasions have been without evidence of infiltrate/pneumonitis.  She reports that the right-sided chest pain that goes around her chest along her as of around the left rib cage is worse with leaning on that side when she would rated that at 6 out of 10 when " "she sits up and takes a deep breath this can get worse but not to the same degree.  There is no history of rash there.  She has not had trauma episode.  She has noted fatigue.  She has noted numbness and tingling in upper and lower extremities.  The symptoms have troubled her intermittently.  As a part of some of the above-noted findings.  She reports longstanding history of mouth sores in association with \"yeast infections\".  She recently was given fluconazole when she had what she describes as \"blockage: \"Feeling in her throat.  And this was based on her previous experience with similar symptoms.  At 1 point she was sure that she has celiac was seen at [facility] but her biopsies were negative but remembers that they were \"growth of yeast\" in those biopsies.  She reports light sensitivity.  She breaks out in a rash with blisters across her face chest neck arms wherever the sun exposure is.  She does not feel overall unwell.  She reports no history of iritis.  As she has not had pulmonary embolism DVT that she is aware of there been no miscarriages 2 pregnancies.  She has not had history of seizure disorder or renal issues that she knows of.  There is no history of Raynaud's symptoms.  She sees a school psychologist.  She does not smoke does not take alcohol.  Her workup has included an MRI of the C-spine showing mild spondylosis.  She is MRI of the brain where she is noted to have periventricular T2 flair hyperintense white matter changes.  She feels she has gluten intolerance by which she means that she develops fatigue and GI symptoms diarrhea when she takes gluten products.  She was thought not to have celiac disease based on normal biopsy on her EGD at [facility] many years ago but over the years she has learned to avoid gluten.  She has sleep apnea.  She has history of hypertension and reports an anaphylactic response to taking amlodipine.           Active Problem List     Patient Active Problem List "   Diagnosis    CARDIOVASCULAR SCREENING; LDL GOAL LESS THAN 160    ADHD, predominantly inattentive type    Hashimoto's thyroiditis    Essential hypertension    Prediabetes    Sleep apnea    Seasonal allergic rhinitis due to pollen    Gluten intolerance     Past Medical History     Past Medical History:   Diagnosis Date    Family history of diabetes mellitus (DM)     father and 2 sisters    Hypertension 2010    Need help finding tx options    Hypothyroid 10/2003     Past Surgical History     Past Surgical History:   Procedure Laterality Date    C ANESTH, SECTION      x2    COLONOSCOPY  2006    EYE SURGERY Right     Strabismus correction    GYN SURGERY   and     C-sections    HC REMOVAL GALLBLADDER      SURGICAL HISTORY OF -       eye surgery for lazy eye     Allergy     Allergies   Allergen Reactions    Amlodipine Anaphylaxis    Ace Inhibitors Angioedema and Cough     Other Reaction(s): Cough, Not available    Food      PN: LW Other1: -Gluten Sensitivity    Gluten Meal Nausea and Vomiting    Lisinopril Angioedema     Intestinal angioedema     Losartan Nausea and GI Disturbance     Current Medication List     Current Outpatient Medications   Medication Sig Dispense Refill    acetaminophen (TYLENOL) 500 MG tablet Take 1,000 mg by mouth.      albuterol (PROVENTIL) (2.5 MG/3ML) 0.083% neb solution Inhale one vial every 20 minutes up to three times. Then every 1-4 hours as needed.      cetirizine (ZYRTEC) 10 MG tablet Take 10 mg by mouth daily.      chlorthalidone (HYGROTON) 25 MG tablet Take 1 tablet (25 mg) by mouth daily 90 tablet 3    Continuous Glucose Sensor (FREESTYLE TATUM 3 PLUS SENSOR) MISC USE AS DIRECTED FOR CONTINUOUS BLOOD GLUCOSE MONITORING - REPLACE EVERY 15 DAYS      fluticasone-salmeterol (ADVAIR) 250-50 MCG/ACT inhaler Inhale 1 puff into the lungs every 12 hours.      levothyroxine (SYNTHROID/LEVOTHROID) 88 MCG tablet Take 1 tablet (88 mcg) by mouth daily 90 tablet 3     montelukast (SINGULAIR) 10 MG tablet Take 10 mg by mouth at bedtime.      MULTIPLE VITAMINS-CALCIUM PO Take 1 tablet by mouth every 24 hours      predniSONE (DELTASONE) 1 MG tablet Take 8 mg by mouth daily.      QNASL 80 MCG/ACT Nasal Spray USE 1 SPRAY IN EACH NOSTRIL TWICE DAILY 10.6 g 11    sulindac (CLINORIL) 200 MG tablet Take 200 mg by mouth 2 times daily.      famotidine (PEPCID AC) 10 MG tablet 10 mg as needed.      levonorgestrel (MIRENA) 52 MG (20 mcg/day) IUD        No current facility-administered medications for this visit.       No current facility-administered medications for this visit.        Family History     Family History   Problem Relation Age of Onset    Lipids Father     Diabetes Father         Type 2    Hypertension Father     Hyperlipidemia Father     Mental Illness Father         Bipolar Disorder    Cerebrovascular Disease Mother     C.A.D. Mother     Hypertension Mother     Depression Mother     Obesity Mother     Diabetes Mother     Diabetes Sister         Type 2    Diabetes Sister         Type 2    Diabetes Sister     Hypertension Sister     Diabetes Sister     Hypertension Sister     Thyroid Disease Sister     Obesity Sister     Hypertension Paternal Grandfather     Prostate Cancer Paternal Grandfather     Other Cancer Maternal Grandmother         Leukemia (Childhood Leukemia, reportedly linked to too many antibiotics)    Thyroid Disease Maternal Grandmother     Anesthesia Reaction Son     Asthma Son     Asthma Daughter     Thyroid Disease Sister          Physical Exam     COMPREHENSIVE EXAMINATION:  Vitals:    07/22/25 1345   BP: 138/74   BP Location: Right arm   Pulse: 97   SpO2: 98%   Weight: (!) 139.2 kg (306 lb 14.4 oz)     A well appearing alert oriented female. Vital data as noted above. Her eyes examined for inflammation/scleromalacia. ENT examined for oral mucositis, moisture, thrush, nasal deformity, external ear redness, deformity. Her neck is examined for suppleness and  "lymphadenopathy.  Cardiopulmonary examination without dyspnea at rest, use of accessory muscles of breathing, wheezing, edema, peripheral or central cyanosis.  Abdomen examined for softness, tenderness, obvious organomegaly.  Skin examined for heliotrope, malar area eruption, lupus pernio, periungual erythema, sclerodactyly, papules, erythema nodosum, purpura, nail pitting, onycholysis, and obvious psoriasis lesion. Neur-ological examination done for alertness, speech, facial symmetry,  tone and power in upper and lower extremities, and gait. The joint examination is performed for swelling, tenderness, warmth, erythema, and range of motion in the following joints: DIPs, PIPs, MCPs, wrists, first CMC's, elbows, shoulders, hips, knees, ankles, feet; spine for range of motion and paraspinal muscles for tenderness. The salient normal / abnormal findings are appended.  There is no rash on the face there is no stomatitis.  She does not have parotid swelling there is no submandibular swelling or nodularity.  She has no sclerodactyly periungual erythema.  She does not have synovitis or palpable joints of her upper and lower extremities.  She has tenderness in the trochanteric area bilaterally and in the lumbosacral region and the joint line of the knees medially.  There is no dactylitis.  She has no vasculopathic lesions on the fingertips of the toe tips.    Labs / Imaging (select studies)     No results found for: \"PPDINDURATIO\", \"PPDREDNESS\", \"TBGOLT\", \"RHF\", \"CCPABG\", \"URIC\", \"HS84185\", \"[id number]\", \"ANTIDNA\", \"ANTIDNAINT\", \"CARIA\", \"[id number]\", \"[id number]\", \"ENASM\", \"ENASCL\", \"JO1\", \"ENASSA\", \"ENASSB\", \"CENTA\", \"E3DPMTO\", \"K2YCNZG\", \"JR7702\"   Hemoglobin   Date Value Ref Range Status   06/26/2025 14.0 11.7 - 15.7 g/dL Final   06/14/2025 15.0 11.7 - 15.7 g/dL Final   04/28/2025 14.7 11.7 - 15.7 g/dL Final   08/13/2009 13.4 11.7 - 15.7 g/dL Final     Urea Nitrogen   Date Value Ref Range Status   06/26/2025 17.6 6.0 - 20.0 " mg/dL Final   06/14/2025 11.9 6.0 - 20.0 mg/dL Final   04/28/2025 12.8 6.0 - 20.0 mg/dL Final   11/03/2021 10 8 - 22 mg/dL Final   10/25/2021 13 7 - 30 mg/dL Final   01/02/2020 15 7 - 30 mg/dL Final     Erythrocyte Sedimentation Rate   Date Value Ref Range Status   11/08/2021 12 0 - 20 mm/hr Final     CRP   Date Value Ref Range Status   11/08/2021 <0.1 0.0-<0.8 mg/dL Final     AST   Date Value Ref Range Status   06/26/2025 14 0 - 45 U/L Final   04/28/2025 19 0 - 45 U/L Final     Albumin   Date Value Ref Range Status   06/26/2025 4.2 3.5 - 5.2 g/dL Final   04/28/2025 4.5 3.5 - 5.2 g/dL Final     Alkaline Phosphatase   Date Value Ref Range Status   06/26/2025 79 40 - 150 U/L Final   04/28/2025 93 40 - 150 U/L Final     ALT   Date Value Ref Range Status   06/26/2025 18 0 - 50 U/L Final   04/28/2025 20 0 - 50 U/L Final          Immunization History     Immunization History   Administered Date(s) Administered    COVID-19 Monovalent 18+ (Moderna) 02/06/2021, 03/06/2021    Flu, Unspecified 11/01/2009, 09/29/2010    Influenza (IIV3) PF 12/05/2009, 10/08/2012, 09/25/2021    Influenza (prior to 2024) 12/05/2009, 09/28/2011    Influenza Intranasal Vaccine 01/29/2016    Influenza Vaccine >6 months,quad, PF 11/19/2018, 10/23/2019, 10/04/2020    Influenza Vaccine IM Ages 6-35 Months 4 Valent (PF) 09/29/2010    Nasal Influenza Vaccine 2-49 (FluMist) 01/29/2016    TD,PF 7+ (Tenivac) 10/25/2021    TDAP Vaccine (Adacel) 09/29/2010    Tetanus 11/01/1999

## 2025-07-23 LAB
C3 SERPL-MCNC: 175 MG/DL (ref 81–157)
C4 SERPL-MCNC: 35 MG/DL (ref 13–39)
CCP AB SER IA-ACNC: 0.8 U/ML
DSDNA AB SER-ACNC: 1.5 IU/ML
ENA SM IGG SER IA-ACNC: <0.7 U/ML
ENA SM IGG SER IA-ACNC: NEGATIVE
ENA SS-A AB SER IA-ACNC: <0.5 U/ML
ENA SS-A AB SER IA-ACNC: NEGATIVE
ENA SS-B IGG SER IA-ACNC: <0.6 U/ML
ENA SS-B IGG SER IA-ACNC: NEGATIVE
U1 SNRNP IGG SER IA-ACNC: 1.1 U/ML
U1 SNRNP IGG SER IA-ACNC: NEGATIVE

## 2025-07-28 ENCOUNTER — OFFICE VISIT (OUTPATIENT)
Dept: RHEUMATOLOGY | Facility: CLINIC | Age: 45
End: 2025-07-28
Payer: COMMERCIAL

## 2025-07-28 ENCOUNTER — OFFICE VISIT (OUTPATIENT)
Dept: CARDIOLOGY | Facility: CLINIC | Age: 45
End: 2025-07-28
Payer: COMMERCIAL

## 2025-07-28 VITALS
HEART RATE: 88 BPM | BODY MASS INDEX: 41.95 KG/M2 | RESPIRATION RATE: 18 BRPM | DIASTOLIC BLOOD PRESSURE: 96 MMHG | WEIGHT: 293 LBS | HEIGHT: 70 IN | SYSTOLIC BLOOD PRESSURE: 146 MMHG

## 2025-07-28 VITALS
SYSTOLIC BLOOD PRESSURE: 130 MMHG | WEIGHT: 293 LBS | RESPIRATION RATE: 18 BRPM | DIASTOLIC BLOOD PRESSURE: 88 MMHG | BODY MASS INDEX: 44.16 KG/M2 | OXYGEN SATURATION: 98 % | HEART RATE: 100 BPM

## 2025-07-28 DIAGNOSIS — R76.8 ANA POSITIVE: Primary | ICD-10-CM

## 2025-07-28 DIAGNOSIS — M77.8 LEFT SHOULDER TENDINITIS: ICD-10-CM

## 2025-07-28 DIAGNOSIS — I10 HYPERTENSION, UNSPECIFIED TYPE: Primary | ICD-10-CM

## 2025-07-28 DIAGNOSIS — R00.2 PALPITATIONS: ICD-10-CM

## 2025-07-28 PROCEDURE — 3075F SYST BP GE 130 - 139MM HG: CPT | Performed by: INTERNAL MEDICINE

## 2025-07-28 PROCEDURE — 3079F DIAST BP 80-89 MM HG: CPT | Performed by: INTERNAL MEDICINE

## 2025-07-28 PROCEDURE — 99214 OFFICE O/P EST MOD 30 MIN: CPT | Performed by: INTERNAL MEDICINE

## 2025-07-28 PROCEDURE — 99204 OFFICE O/P NEW MOD 45 MIN: CPT | Performed by: INTERNAL MEDICINE

## 2025-07-28 PROCEDURE — 3077F SYST BP >= 140 MM HG: CPT | Performed by: INTERNAL MEDICINE

## 2025-07-28 PROCEDURE — 3080F DIAST BP >= 90 MM HG: CPT | Performed by: INTERNAL MEDICINE

## 2025-07-28 RX ORDER — METHYLPREDNISOLONE 4 MG/1
TABLET ORAL
COMMUNITY
Start: 2025-07-25

## 2025-07-28 NOTE — PROGRESS NOTES
"[facility]   [address], [place] [address]   [email or web address]   OFFICE: [phone]      Thank you Dr. oCpeland for asking the [facility] Care team to participate in the care of your patient, Madyson Downing.     Impression and Plan     1.  Palpitations.  Based on Madyson's description, symptoms sound most consistent with PVCs versus PACs.  Plan:  Will obtain 14-day ambulatory monitor to further clarify.  Will obtain echocardiogram to evaluate for structural heart disease.    2.  Hypertension.  As noted below, she has a history of early onset hypertension in setting of primary aldosteronism s/p adrenalectomy (25 March 2025 at [facility]).  She had been noted to have some worsening hypertension and was restarted on chlorthalidone 21 May 2025 though this was recently halved.  Blood pressures recently have been reasonable.    Follow-up and further recommendations pending test results.    35 minutes spent reviewing prior records (including documentation, laboratory studies, cardiac testing/imaging), interview with patient along with physical exam, planning, and subsequent documentation/crafting of note).             History of Present Illness    Once again I would like to thank you again for asking me to participate in the care of your patient, Madyson Downing.  As you know, but to reiterate for my own records, Madyson Downing is a 45 year old female with history of early onset hypertension in setting of primary aldosteronism s/p adrenalectomy (25 March 2025 at [facility]).  She had been noted to have some worsening hypertension and was restarted on chlorthalidone 21 May 2025.    She also has a history of up obstructive sleep apnea though has been intolerant of CPAP therapy.    Today she presents for evaluation of palpitations.  She states that she has had intermittent episodes where her heart seems to \"skip\".  She had a fainting spell in the more " "distant past that previously was worked up, but otherwise has had no recurrence of such symptoms.  She denies any chest pain.  No prominent lightheadedness.  Breathing is currently comfortable.    Further review of systems is otherwise negative/noncontributory (medical record and 13 point review of systems reviewed as well and pertinent positives noted).         Cardiac Diagnostics/Imaging      Twelve-lead ECG (personally reviewed) 26 June 2025: Sinus rhythm.  LVH.    Chest radiograph 25 July 2025:  The heart size and cardiomediastinal silhouette appear normal.   The lungs appear clear.   No pneumothorax.   No definitive acute fracture.    CT scan of chest 5 July 2025:  CHEST: Pulmonary arteries are normal caliber and negative for pulmonary emboli. Thoracic aorta is negative for dissection. No CT evidence of right heart strain. LUNGS AND PLEURA: No infiltrate or pleural effusion. No pulmonary nodules or masses.   MEDIASTINUM/AXILLAE: No lymphadenopathy. No thoracic aortic aneurysm. No pericardial effusion. CORONARY ARTERY CALCIFICATION: None.   UPPER ABDOMEN: Cholecystectomy.   MUSCULOSKELETAL: Degenerative changes in the spine. No suspicious lesions in the bones.    CT scan of the chest 26 June 2025:  CHEST WALL AND LOWER NECK: Unremarkable.   HEART AND VASCULATURE: Normal heart size. No pericardial effusion. No thoracic aortic aneurysm. No significant coronary artery   calcifications.   MEDIASTINUM: Unremarkable esophagus. No adenopathy.   LUNGS AND PLEURAL SPACE: Patent central airways. Clear lungs. No pneumothorax or pleural effusion. No suspicious pulmonary nodule.   UPPER ABDOMEN: Cholecystectomy and left adrenalectomy.             Physical Examination       BP (!) 146/96 (BP Location: Right arm, Patient Position: Sitting, Cuff Size: Adult Large)   Pulse 88   Resp 18   Ht 1.778 m (5' 10\")   Wt (!) 140.2 kg (309 lb)   BMI 44.34 kg/m          Wt Readings from Last 3 Encounters:   07/28/25 (!) 140.2 kg (309 " lb)   25 (!) 139.6 kg (307 lb 12.8 oz)   25 (!) 139.2 kg (306 lb 14.4 oz)       The patient is alert and oriented times three. Sclerae are anicteric. Mucosal membranes are moist. Jugular venous pressure is normal. No significant adenopathy/thyromegally appreciated. Lungs are clear with good expansion. On cardiovascular exam, the patient has a regular S1 and S2.  Very soft systolic murmur at right sternal border only.  Abdomen is soft and non-tender. Extremities reveal no clubbing, cyanosis, or edema       Family History/Social History/Risk Factors   Patient does not smoke.  Family history reviewed, and family history includes Anesthesia Reaction in her son; Asthma in her daughter and son; C.A.D. in her mother; Cerebrovascular Disease in her mother; Depression in her mother; Diabetes in her father, mother, sister, sister, sister, and sister; Hyperlipidemia in her father; Hypertension in her father, mother, paternal grandfather, sister, and sister; Lipids in her father; Mental Illness in her father; Obesity in her mother and sister; Other Cancer in her maternal grandmother; Prostate Cancer in her paternal grandfather; Thyroid Disease in her maternal grandmother, sister, and sister.          Medical History  Surgical History Family History Social History   Past Medical History:   Diagnosis Date    Family history of diabetes mellitus (DM)     father and 2 sisters    Hypertension 2010    Need help finding tx options    Hypothyroid 10/2003     Past Surgical History:   Procedure Laterality Date    C ANESTH, SECTION      x2    COLONOSCOPY  2006    EYE SURGERY Right     Strabismus correction    GYN SURGERY   and     C-sections    HC REMOVAL GALLBLADDER      SURGICAL HISTORY OF -       eye surgery for lazy eye     Family History   Problem Relation Age of Onset    Lipids Father     Diabetes Father         Type 2    Hypertension Father     Hyperlipidemia Father     Mental Illness Father          Bipolar Disorder    Cerebrovascular Disease Mother     C.A.D. Mother     Hypertension Mother     Depression Mother     Obesity Mother     Diabetes Mother     Diabetes Sister         Type 2    Diabetes Sister         Type 2    Diabetes Sister     Hypertension Sister     Diabetes Sister     Hypertension Sister     Thyroid Disease Sister     Obesity Sister     Hypertension Paternal Grandfather     Prostate Cancer Paternal Grandfather     Other Cancer Maternal Grandmother         Leukemia (Childhood Leukemia, reportedly linked to too many antibiotics)    Thyroid Disease Maternal Grandmother     Anesthesia Reaction Son     Asthma Son     Asthma Daughter     Thyroid Disease Sister         Social History     Socioeconomic History    Marital status:      Spouse name: Not on file    Number of children: Not on file    Years of education: Not on file    Highest education level: Not on file   Occupational History    Not on file   Tobacco Use    Smoking status: Never    Smokeless tobacco: Never   Substance and Sexual Activity    Alcohol use: Yes     Comment: Rarely, only at holiday gatherings with in-laws    Drug use: No    Sexual activity: Yes     Partners: Male     Birth control/protection: I.U.D.   Other Topics Concern    Parent/sibling w/ CABG, MI or angioplasty before 65F 55M? Yes     Comment: Mother at 55   Social History Narrative    Not on file     Social Drivers of Health     Financial Resource Strain: Not on file   Food Insecurity: No Food Insecurity (4/28/2025)    Received from [employer]    Hunger Vital Sign     Worried About Running Out of Food in the Last Year: Never true     Ran Out of Food in the Last Year: Never true   Transportation Needs: No Transportation Needs (4/28/2025)    Received from [employer]    PRAPARE - Transportation     Lack of Transportation (Medical): No     Lack of Transportation (Non-Medical): No   Physical Activity: Insufficiently Active (2/21/2025)    Received from  [facility]    Exercise Vital Sign     Days of Exercise per Week: 3 days     Minutes of Exercise per Session: 20 min   Stress: Not on file   Social Connections: Not on file   Interpersonal Safety: Not At Risk (4/18/2025)    Received from [employer]    Humiliation, Afraid, Rape, and Kick questionnaire     Fear of Current or Ex-Partner: No     Emotionally Abused: No     Physically Abused: No     Sexually Abused: No   Housing Stability: Low Risk  (4/28/2025)    Received from [employer]    Housing Stability Vital Sign     Unable to Pay for Housing in the Last Year: No     Number of Times Moved in the Last Year: 0     Homeless in the Last Year: No           Medications  Allergies   Current Outpatient Medications   Medication Sig Dispense Refill    acetaminophen (TYLENOL) 500 MG tablet Take 1,000 mg by mouth.      albuterol (PROVENTIL) (2.5 MG/3ML) 0.083% neb solution Inhale one vial every 20 minutes up to three times. Then every 1-4 hours as needed.      cetirizine (ZYRTEC) 10 MG tablet Take 10 mg by mouth daily.      Continuous Glucose Sensor (FREESTYLE TATUM 3 PLUS SENSOR) MISC USE AS DIRECTED FOR CONTINUOUS BLOOD GLUCOSE MONITORING - REPLACE EVERY 15 DAYS      famotidine (PEPCID AC) 10 MG tablet 10 mg as needed.      fluticasone-salmeterol (ADVAIR) 250-50 MCG/ACT inhaler Inhale 1 puff into the lungs every 12 hours.      levonorgestrel (MIRENA) 52 MG (20 mcg/day) IUD       levothyroxine (SYNTHROID/LEVOTHROID) 88 MCG tablet Take 1 tablet (88 mcg) by mouth daily 90 tablet 3    methylPREDNISolone (MEDROL DOSEPAK) 4 MG tablet therapy pack Follow package directions      montelukast (SINGULAIR) 10 MG tablet Take 10 mg by mouth at bedtime.      MULTIPLE VITAMINS-CALCIUM PO Take 1 tablet by mouth every 24 hours      predniSONE (DELTASONE) 1 MG tablet Take 8 mg by mouth daily.      QNASL 80 MCG/ACT Nasal Spray USE 1 SPRAY IN EACH NOSTRIL TWICE DAILY 10.6 g 11    sulindac (CLINORIL) 200 MG tablet Take 200 mg by mouth 2  "times daily.         Allergies   Allergen Reactions    Amlodipine Anaphylaxis    Ace Inhibitors Angioedema and Cough     Other Reaction(s): Cough, Not available    Food      PN: LW Other1: -Gluten Sensitivity    Gluten Meal Nausea and Vomiting    Lisinopril Angioedema     Intestinal angioedema     Losartan Nausea and GI Disturbance          Lab Results    Chemistry/lipid CBC Cardiac Enzymes/BNP/TSH/INR   No results for input(s): \"CHOL\", \"HDL\", \"LDL\", \"TRIG\", \"CHOLHDLRATIO\" in the last 08093 hours.  No results for input(s): \"LDL\" in the last 15456 hours.  Recent Labs   Lab Test 07/22/25  1501 06/26/25  2255   NA  --  135   POTASSIUM  --  3.7   CHLORIDE  --  99   CO2  --  23   GLC  --  120*   BUN  --  17.6   CR 0.57 0.67   GFRESTIMATED >90 >90   ANNI  --  9.1     Recent Labs   Lab Test 07/22/25  1501 06/26/25  2255 06/14/25  1857   CR 0.57 0.67 0.53     Recent Labs   Lab Test 10/25/21  1710   A1C 5.3          Recent Labs   Lab Test 07/22/25  1501   WBC 9.0   HGB 13.9   HCT 40.8   MCV 91        Recent Labs   Lab Test 07/22/25  1501 06/26/25  2255 06/14/25  1857   HGB 13.9 14.0 15.0    No results for input(s): \"TROPONINI\" in the last 34907 hours.  Recent Labs   Lab Test 06/14/25  1857   NTBNP 75     Recent Labs   Lab Test 06/14/25  1857   TSH 2.33     Recent Labs   Lab Test 04/28/25  1235   INR 0.92          Medications  Allergies   Current Outpatient Medications   Medication Sig Dispense Refill    acetaminophen (TYLENOL) 500 MG tablet Take 1,000 mg by mouth.      albuterol (PROVENTIL) (2.5 MG/3ML) 0.083% neb solution Inhale one vial every 20 minutes up to three times. Then every 1-4 hours as needed.      cetirizine (ZYRTEC) 10 MG tablet Take 10 mg by mouth daily.      Continuous Glucose Sensor (FREESTYLE TATUM 3 PLUS SENSOR) MISC USE AS DIRECTED FOR CONTINUOUS BLOOD GLUCOSE MONITORING - REPLACE EVERY 15 DAYS      famotidine (PEPCID AC) 10 MG tablet 10 mg as needed.      fluticasone-salmeterol (ADVAIR) 250-50 " "MCG/ACT inhaler Inhale 1 puff into the lungs every 12 hours.      levonorgestrel (MIRENA) 52 MG (20 mcg/day) IUD       levothyroxine (SYNTHROID/LEVOTHROID) 88 MCG tablet Take 1 tablet (88 mcg) by mouth daily 90 tablet 3    methylPREDNISolone (MEDROL DOSEPAK) 4 MG tablet therapy pack Follow package directions      montelukast (SINGULAIR) 10 MG tablet Take 10 mg by mouth at bedtime.      MULTIPLE VITAMINS-CALCIUM PO Take 1 tablet by mouth every 24 hours      predniSONE (DELTASONE) 1 MG tablet Take 8 mg by mouth daily.      QNASL 80 MCG/ACT Nasal Spray USE 1 SPRAY IN EACH NOSTRIL TWICE DAILY 10.6 g 11    sulindac (CLINORIL) 200 MG tablet Take 200 mg by mouth 2 times daily.        Allergies   Allergen Reactions    Amlodipine Anaphylaxis    Ace Inhibitors Angioedema and Cough     Other Reaction(s): Cough, Not available    Food      PN: LW Other1: -Gluten Sensitivity    Gluten Meal Nausea and Vomiting    Lisinopril Angioedema     Intestinal angioedema     Losartan Nausea and GI Disturbance          Lab Results   Lab Results   Component Value Date     06/26/2025     01/02/2020    CO2 23 06/26/2025    CO2 28 11/03/2021    CO2 28 01/02/2020    BUN 17.6 06/26/2025    BUN 10 11/03/2021    BUN 15 01/02/2020     Lab Results   Component Value Date    WBC 9.0 07/22/2025    HGB 13.9 07/22/2025    HGB 13.4 08/13/2009    HCT 40.8 07/22/2025    MCV 91 07/22/2025     07/22/2025     Lab Results   Component Value Date    CHOL 170 08/13/2009    TRIG 148 08/13/2009    HDL 54 08/13/2009     Lab Results   Component Value Date    INR 0.92 04/28/2025     No results found for: \"BNP\"  No results found for: \"CKTOTAL\", \"CKMB\", \"TROPONINI\"  Lab Results   Component Value Date    TSH 2.33 06/14/2025    TSH 1.42 10/25/2021                  "

## 2025-07-28 NOTE — PROGRESS NOTES
"      Rheumatology follow-up visit note     Madyson is a 45 year old female presents today for follow-up.    Madyson was seen today for recheck.    Diagnoses and all orders for this visit:    HENRI positive    Left shoulder tendinitis             HPI    Madyson Downing is a 45 year old female is here for follow-up of positive HENRI, in the background of complexset of symptoms.  She was seen here recently for that her further interrogation of the eye reveals none of the PARVEEN's dsDNA complement show abnormalities C3 was slightly elevated as well as minimally elevated sedimentation rate.  CRP was normal.  Her CBC was entirely normal.  Her intermittent left shoulder pain likely secondary to tendinopathy that was discussed in the past, recent x-rays showed features suggestive of tendinopathy.  Today she accompanies her .  We discussed once again the key question in her context if her positive HENRI is associated with a clinical Disease appears to be unlikely reassured her.  Management of tendinopathy of the shoulders were outlined.  She would like to defer any intervention at this time.  She will continue to follow-up with her primary physician.  She will come back to rheumatology on as-needed basis.           Following is the excerpt from a previous note:   She reports that this was first discovered when she was 24 and has had more than 1 evaluations at rheumatology offices where she was informed that this may be part of Hashimoto's thyroiditis without evidence of connective tissue disease such as SLE.  This came up again when she had fatigue.  She has had numbness and tingling affecting her extremities.  She is under workup at the [place] neurology clinic.  She has recently had adrenalectomy unilateral for primary aldosteronism.  She is on prednisone 4 mg daily as replacement.  She has photosensitivity.  She has history of mouth ulcers.  She feels those are associated with \"yeast infection\".  Apart from these observations " there is little to suggest that she has active connective tissue disease.  However given the complexity of her symptoms it will be important to continue to make observations going forward and further workup as noted.  Will get together in the next few weeks once these data are available.  Today I spent quite some time with her outlining to her what HENRI stands for.  We discussed that how a serologic abnormality does not automatically equate to a clinical condition/syndrome.  She is going to continue her workup at neurology clinic.  Once today's data are available further course to be charted.  We talked about management of bursitis that she has likely adding to her trochanteric region pain.  Will revisit this once the lab data and x-rays are become available.  X-rays of the knees taken today, personally reviewed the films, mild joint space reduction in the medial compartment intact in lateral as well as patellofemoral no chondrocalcinosis.     DETAILED EXAMINATION  07/28/25  :    Vitals:    07/28/25 0935   BP: 130/88   BP Location: Right arm   Patient Position: Sitting   Cuff Size: Adult Large   Pulse: 100   Resp: 18   SpO2: 98%   Weight: (!) 139.6 kg (307 lb 12.8 oz)     Alert oriented. Head including the face is examined for malar rash, heliotropes, scarring, lupus pernio. Eyes examined for redness such as in episcleritis/scleritis, periorbital lesions.   Neck/ Face examined for parotid gland swelling, range of motion of neck.  Left upper and lower and right upper and lower extremities examined for tenderness, swelling, warmth of the appendicular joints, range of motion, edema, rash.  Some of the important findings included: she does not have evidence of synovitis in the palpable joints of the upper extremities.  No significant deformities of the digits.   There is no rash on her face.     Patient Active Problem List    Diagnosis Date Noted    Seasonal allergic rhinitis due to pollen 11/19/2019     Priority: Medium     Gluten intolerance 2019     Priority: Medium     Celiac disease ruled out at [facility].      Prediabetes 12/10/2018     Priority: Medium     A1c 5.8 at outside clinic      Sleep apnea 2013     Priority: Medium    ADHD, predominantly inattentive type 2011     Priority: Medium    Hashimoto's thyroiditis 2010     Priority: Medium     S/p treatment, now hypothyroid on replacement      Essential hypertension 2010     Priority: Medium     Diagnosis 9/14/10, started lisinopril due to obesity, FH of DM  Hypertension      CARDIOVASCULAR SCREENING; LDL GOAL LESS THAN 160 2010     Priority: Medium     Past Surgical History:   Procedure Laterality Date    C ANESTH, SECTION      x2    COLONOSCOPY  2006    EYE SURGERY Right     Strabismus correction    GYN SURGERY   and     C-sections    HC REMOVAL GALLBLADDER      SURGICAL HISTORY OF -       eye surgery for lazy eye      Past Medical History:   Diagnosis Date    Family history of diabetes mellitus (DM)     father and 2 sisters    Hypertension 2010    Need help finding tx options    Hypothyroid 10/2003     Allergies   Allergen Reactions    Amlodipine Anaphylaxis    Ace Inhibitors Angioedema and Cough     Other Reaction(s): Cough, Not available    Food      PN: LW Other1: -Gluten Sensitivity    Gluten Meal Nausea and Vomiting    Lisinopril Angioedema     Intestinal angioedema     Losartan Nausea and GI Disturbance     Current Outpatient Medications   Medication Sig Dispense Refill    acetaminophen (TYLENOL) 500 MG tablet Take 1,000 mg by mouth.      albuterol (PROVENTIL) (2.5 MG/3ML) 0.083% neb solution Inhale one vial every 20 minutes up to three times. Then every 1-4 hours as needed.      cetirizine (ZYRTEC) 10 MG tablet Take 10 mg by mouth daily.      chlorthalidone (HYGROTON) 25 MG tablet Take 1 tablet (25 mg) by mouth daily 90 tablet 3    Continuous Glucose Sensor (FREESTYLE TATUM 3 PLUS SENSOR) MISC USE  AS DIRECTED FOR CONTINUOUS BLOOD GLUCOSE MONITORING - REPLACE EVERY 15 DAYS      famotidine (PEPCID AC) 10 MG tablet 10 mg as needed.      fluticasone-salmeterol (ADVAIR) 250-50 MCG/ACT inhaler Inhale 1 puff into the lungs every 12 hours.      levonorgestrel (MIRENA) 52 MG (20 mcg/day) IUD       levothyroxine (SYNTHROID/LEVOTHROID) 88 MCG tablet Take 1 tablet (88 mcg) by mouth daily 90 tablet 3    montelukast (SINGULAIR) 10 MG tablet Take 10 mg by mouth at bedtime.      MULTIPLE VITAMINS-CALCIUM PO Take 1 tablet by mouth every 24 hours      predniSONE (DELTASONE) 1 MG tablet Take 8 mg by mouth daily.      QNASL 80 MCG/ACT Nasal Spray USE 1 SPRAY IN EACH NOSTRIL TWICE DAILY 10.6 g 11    sulindac (CLINORIL) 200 MG tablet Take 200 mg by mouth 2 times daily.       family history includes Anesthesia Reaction in her son; Asthma in her daughter and son; C.A.D. in her mother; Cerebrovascular Disease in her mother; Depression in her mother; Diabetes in her father, mother, sister, sister, sister, and sister; Hyperlipidemia in her father; Hypertension in her father, mother, paternal grandfather, sister, and sister; Lipids in her father; Mental Illness in her father; Obesity in her mother and sister; Other Cancer in her maternal grandmother; Prostate Cancer in her paternal grandfather; Thyroid Disease in her maternal grandmother, sister, and sister.  Social Connections: Not on file          WBC Count   Date Value Ref Range Status   07/22/2025 9.0 4.0 - 11.0 10e3/uL Final     RBC Count   Date Value Ref Range Status   07/22/2025 4.49 3.80 - 5.20 10e6/uL Final     Hemoglobin   Date Value Ref Range Status   07/22/2025 13.9 11.7 - 15.7 g/dL Final   08/13/2009 13.4 11.7 - 15.7 g/dL Final     Hematocrit   Date Value Ref Range Status   07/22/2025 40.8 35.0 - 47.0 % Final     MCV   Date Value Ref Range Status   07/22/2025 91 78 - 100 fL Final     MCH   Date Value Ref Range Status   07/22/2025 31.0 26.5 - 33.0 pg Final     Platelet Count    Date Value Ref Range Status   07/22/2025 291 150 - 450 10e3/uL Final     % Lymphocytes   Date Value Ref Range Status   07/22/2025 18 % Final     AST   Date Value Ref Range Status   06/26/2025 14 0 - 45 U/L Final     ALT   Date Value Ref Range Status   07/22/2025 24 0 - 50 U/L Final     Albumin   Date Value Ref Range Status   07/22/2025 4.2 3.5 - 5.2 g/dL Final     Alkaline Phosphatase   Date Value Ref Range Status   06/26/2025 79 40 - 150 U/L Final     Creatinine   Date Value Ref Range Status   07/22/2025 0.57 0.51 - 0.95 mg/dL Final   01/02/2020 0.53 0.52 - 1.04 mg/dL Final     GFR Estimate   Date Value Ref Range Status   07/22/2025 >90 >60 mL/min/1.73m2 Final     Comment:     eGFR calculated using 2021 CKD-EPI equation.   01/02/2020 >90 >60 mL/min/[1.73_m2] Final     Comment:     Non  GFR Calc  Starting 12/18/2018, serum creatinine based estimated GFR (eGFR) will be   calculated using the Chronic Kidney Disease Epidemiology Collaboration   (CKD-EPI) equation.       GFR Estimate If Black   Date Value Ref Range Status   01/02/2020 >90 >60 mL/min/[1.73_m2] Final     Comment:      GFR Calc  Starting 12/18/2018, serum creatinine based estimated GFR (eGFR) will be   calculated using the Chronic Kidney Disease Epidemiology Collaboration   (CKD-EPI) equation.       Erythrocyte Sedimentation Rate   Date Value Ref Range Status   07/22/2025 23 (H) 0 - 20 mm/hr Final     CRP   Date Value Ref Range Status   11/08/2021 <0.1 0.0-<0.8 mg/dL Final

## 2025-07-28 NOTE — LETTER
7/28/2025    [facility] [address] [facility] [address]  [facility] [address]    RE: Madyson LAI Chang       Dear Colleague,     I had the pleasure of seeing Madyson Downing in the [facility].         [facility]   [address], [place] [address]   [email or web address]   OFFICE: [phone]          Thank you Dr. Copeland for asking the [facility] team to participate in the care of your patient, Madyson Downing.     Impression and Plan     1.  Palpitations.  Based on Madyson's description, symptoms sound most consistent with PVCs versus PACs.  Plan:  Will obtain 14-day ambulatory monitor to further clarify.  Will obtain echocardiogram to evaluate for structural heart disease.    2.  Hypertension.  As noted below, she has a history of early onset hypertension in setting of primary aldosteronism s/p adrenalectomy (25 March 2025 at [facility]).  She had been noted to have some worsening hypertension and was restarted on chlorthalidone 21 May 2025 though this was recently halved.  Blood pressures recently have been reasonable.    Follow-up and further recommendations pending test results.    35 minutes spent reviewing prior records (including documentation, laboratory studies, cardiac testing/imaging), interview with patient along with physical exam, planning, and subsequent documentation/crafting of note).             History of Present Illness    Once again I would like to thank you again for asking me to participate in the care of your patient, Madyson Downing.  As you know, but to reiterate for my own records, Madyson Downing is a 45 year old female with history of early onset hypertension in setting of primary aldosteronism s/p adrenalectomy (25 March 2025 at [facility]).  She had been noted to have some worsening hypertension and was restarted on chlorthalidone 21 May 2025.    She  "also has a history of up obstructive sleep apnea though has been intolerant of CPAP therapy.    Today she presents for evaluation of palpitations.  She states that she has had intermittent episodes where her heart seems to \"skip\".  She had a fainting spell in the more distant past that previously was worked up, but otherwise has had no recurrence of such symptoms.  She denies any chest pain.  No prominent lightheadedness.  Breathing is currently comfortable.    Further review of systems is otherwise negative/noncontributory (medical record and 13 point review of systems reviewed as well and pertinent positives noted).         Cardiac Diagnostics/Imaging      Twelve-lead ECG (personally reviewed) 26 June 2025: Sinus rhythm.  LVH.    Chest radiograph 25 July 2025:  The heart size and cardiomediastinal silhouette appear normal.   The lungs appear clear.   No pneumothorax.   No definitive acute fracture.    CT scan of chest 5 July 2025:  CHEST: Pulmonary arteries are normal caliber and negative for pulmonary emboli. Thoracic aorta is negative for dissection. No CT evidence of right heart strain. LUNGS AND PLEURA: No infiltrate or pleural effusion. No pulmonary nodules or masses.   MEDIASTINUM/AXILLAE: No lymphadenopathy. No thoracic aortic aneurysm. No pericardial effusion. CORONARY ARTERY CALCIFICATION: None.   UPPER ABDOMEN: Cholecystectomy.   MUSCULOSKELETAL: Degenerative changes in the spine. No suspicious lesions in the bones.    CT scan of the chest 26 June 2025:  CHEST WALL AND LOWER NECK: Unremarkable.   HEART AND VASCULATURE: Normal heart size. No pericardial effusion. No thoracic aortic aneurysm. No significant coronary artery   calcifications.   MEDIASTINUM: Unremarkable esophagus. No adenopathy.   LUNGS AND PLEURAL SPACE: Patent central airways. Clear lungs. No pneumothorax or pleural effusion. No suspicious pulmonary nodule.   UPPER ABDOMEN: Cholecystectomy and left adrenalectomy.             Physical " "Examination       BP (!) 146/96 (BP Location: Right arm, Patient Position: Sitting, Cuff Size: Adult Large)   Pulse 88   Resp 18   Ht 1.778 m (5' 10\")   Wt (!) 140.2 kg (309 lb)   BMI 44.34 kg/m          Wt Readings from Last 3 Encounters:   25 (!) 140.2 kg (309 lb)   25 (!) 139.6 kg (307 lb 12.8 oz)   25 (!) 139.2 kg (306 lb 14.4 oz)       The patient is alert and oriented times three. Sclerae are anicteric. Mucosal membranes are moist. Jugular venous pressure is normal. No significant adenopathy/thyromegally appreciated. Lungs are clear with good expansion. On cardiovascular exam, the patient has a regular S1 and S2.  Very soft systolic murmur at right sternal border only.  Abdomen is soft and non-tender. Extremities reveal no clubbing, cyanosis, or edema       Family History/Social History/Risk Factors   Patient does not smoke.  Family history reviewed, and family history includes Anesthesia Reaction in her son; Asthma in her daughter and son; C.A.D. in her mother; Cerebrovascular Disease in her mother; Depression in her mother; Diabetes in her father, mother, sister, sister, sister, and sister; Hyperlipidemia in her father; Hypertension in her father, mother, paternal grandfather, sister, and sister; Lipids in her father; Mental Illness in her father; Obesity in her mother and sister; Other Cancer in her maternal grandmother; Prostate Cancer in her paternal grandfather; Thyroid Disease in her maternal grandmother, sister, and sister.          Medical History  Surgical History Family History Social History   Past Medical History:   Diagnosis Date     Family history of diabetes mellitus (DM)     father and 2 sisters     Hypertension 2010    Need help finding tx options     Hypothyroid 10/2003     Past Surgical History:   Procedure Laterality Date     C ANESTH, SECTION      x2     COLONOSCOPY  2006     EYE SURGERY Right     Strabismus correction     GYN SURGERY   and     " C-sections     HC REMOVAL GALLBLADDER  1998     SURGICAL HISTORY OF -   1985    eye surgery for lazy eye     Family History   Problem Relation Age of Onset     Lipids Father      Diabetes Father         Type 2     Hypertension Father      Hyperlipidemia Father      Mental Illness Father         Bipolar Disorder     Cerebrovascular Disease Mother      C.A.D. Mother      Hypertension Mother      Depression Mother      Obesity Mother      Diabetes Mother      Diabetes Sister         Type 2     Diabetes Sister         Type 2     Diabetes Sister      Hypertension Sister      Diabetes Sister      Hypertension Sister      Thyroid Disease Sister      Obesity Sister      Hypertension Paternal Grandfather      Prostate Cancer Paternal Grandfather      Other Cancer Maternal Grandmother         Leukemia (Childhood Leukemia, reportedly linked to too many antibiotics)     Thyroid Disease Maternal Grandmother      Anesthesia Reaction Son      Asthma Son      Asthma Daughter      Thyroid Disease Sister         Social History     Socioeconomic History     Marital status:      Spouse name: Not on file     Number of children: Not on file     Years of education: Not on file     Highest education level: Not on file   Occupational History     Not on file   Tobacco Use     Smoking status: Never     Smokeless tobacco: Never   Substance and Sexual Activity     Alcohol use: Yes     Comment: Rarely, only at holiday gatherings with in-laws     Drug use: No     Sexual activity: Yes     Partners: Male     Birth control/protection: I.U.D.   Other Topics Concern     Parent/sibling w/ CABG, MI or angioplasty before 65F 55M? Yes     Comment: Mother at 55   Social History Narrative     Not on file     Social Drivers of Health     Financial Resource Strain: Not on file   Food Insecurity: No Food Insecurity (4/28/2025)    [facility]    Hunger Vital Sign      Worried About Running Out of Food in the Last Year: Never true       Ran Out of Food in the Last Year: Never true   Transportation Needs: No Transportation Needs (4/28/2025)    Received from [facility]    PRAPARE - Transportation      Lack of Transportation (Medical): No      Lack of Transportation (Non-Medical): No   Physical Activity: Insufficiently Active (2/21/2025)    Received from [facility]    Exercise Vital Sign      Days of Exercise per Week: 3 days      Minutes of Exercise per Session: 20 min   Stress: Not on file   Social Connections: Not on file   Interpersonal Safety: Not At Risk (4/18/2025)    Received from [employer]    Humiliation, Afraid, Rape, and Kick questionnaire      Fear of Current or Ex-Partner: No      Emotionally Abused: No      Physically Abused: No      Sexually Abused: No   Housing Stability: Low Risk  (4/28/2025)    Received from [employer]    Housing Stability Vital Sign      Unable to Pay for Housing in the Last Year: No      Number of Times Moved in the Last Year: 0      Homeless in the Last Year: No           Medications  Allergies   Current Outpatient Medications   Medication Sig Dispense Refill     acetaminophen (TYLENOL) 500 MG tablet Take 1,000 mg by mouth.       albuterol (PROVENTIL) (2.5 MG/3ML) 0.083% neb solution Inhale one vial every 20 minutes up to three times. Then every 1-4 hours as needed.       cetirizine (ZYRTEC) 10 MG tablet Take 10 mg by mouth daily.       Continuous Glucose Sensor (FREESTYLE TATUM 3 PLUS SENSOR) MISC USE AS DIRECTED FOR CONTINUOUS BLOOD GLUCOSE MONITORING - REPLACE EVERY 15 DAYS       famotidine (PEPCID AC) 10 MG tablet 10 mg as needed.       fluticasone-salmeterol (ADVAIR) 250-50 MCG/ACT inhaler Inhale 1 puff into the lungs every 12 hours.       levonorgestrel (MIRENA) 52 MG (20 mcg/day) IUD        levothyroxine (SYNTHROID/LEVOTHROID) 88 MCG tablet Take 1 tablet (88 mcg) by mouth daily 90 tablet 3     methylPREDNISolone (MEDROL DOSEPAK) 4 MG tablet therapy pack Follow package directions        "montelukast (SINGULAIR) 10 MG tablet Take 10 mg by mouth at bedtime.       MULTIPLE VITAMINS-CALCIUM PO Take 1 tablet by mouth every 24 hours       predniSONE (DELTASONE) 1 MG tablet Take 8 mg by mouth daily.       QNASL 80 MCG/ACT Nasal Spray USE 1 SPRAY IN EACH NOSTRIL TWICE DAILY 10.6 g 11     sulindac (CLINORIL) 200 MG tablet Take 200 mg by mouth 2 times daily.         Allergies   Allergen Reactions     Amlodipine Anaphylaxis     Ace Inhibitors Angioedema and Cough     Other Reaction(s): Cough, Not available     Food      PN: LW Other1: -Gluten Sensitivity     Gluten Meal Nausea and Vomiting     Lisinopril Angioedema     Intestinal angioedema      Losartan Nausea and GI Disturbance          Lab Results    Chemistry/lipid CBC Cardiac Enzymes/BNP/TSH/INR   No results for input(s): \"CHOL\", \"HDL\", \"LDL\", \"TRIG\", \"CHOLHDLRATIO\" in the last 86748 hours.  No results for input(s): \"LDL\" in the last 53921 hours.  Recent Labs   Lab Test 07/22/25  1501 06/26/25 2255   NA  --  135   POTASSIUM  --  3.7   CHLORIDE  --  99   CO2  --  23   GLC  --  120*   BUN  --  17.6   CR 0.57 0.67   GFRESTIMATED >90 >90   ANNI  --  9.1     Recent Labs   Lab Test 07/22/25  1501 06/26/25 2255 06/14/25  1857   CR 0.57 0.67 0.53     Recent Labs   Lab Test 10/25/21  1710   A1C 5.3          Recent Labs   Lab Test 07/22/25  1501   WBC 9.0   HGB 13.9   HCT 40.8   MCV 91        Recent Labs   Lab Test 07/22/25  1501 06/26/25  2255 06/14/25  1857   HGB 13.9 14.0 15.0    No results for input(s): \"TROPONINI\" in the last 36535 hours.  Recent Labs   Lab Test 06/14/25  1857   NTBNP 75     Recent Labs   Lab Test 06/14/25  1857   TSH 2.33     Recent Labs   Lab Test 04/28/25  1235   INR 0.92          Medications  Allergies   Current Outpatient Medications   Medication Sig Dispense Refill     acetaminophen (TYLENOL) 500 MG tablet Take 1,000 mg by mouth.       albuterol (PROVENTIL) (2.5 MG/3ML) 0.083% neb solution Inhale one vial every 20 minutes up to " three times. Then every 1-4 hours as needed.       cetirizine (ZYRTEC) 10 MG tablet Take 10 mg by mouth daily.       Continuous Glucose Sensor (FREESTYLE TATUM 3 PLUS SENSOR) MISC USE AS DIRECTED FOR CONTINUOUS BLOOD GLUCOSE MONITORING - REPLACE EVERY 15 DAYS       famotidine (PEPCID AC) 10 MG tablet 10 mg as needed.       fluticasone-salmeterol (ADVAIR) 250-50 MCG/ACT inhaler Inhale 1 puff into the lungs every 12 hours.       levonorgestrel (MIRENA) 52 MG (20 mcg/day) IUD        levothyroxine (SYNTHROID/LEVOTHROID) 88 MCG tablet Take 1 tablet (88 mcg) by mouth daily 90 tablet 3     methylPREDNISolone (MEDROL DOSEPAK) 4 MG tablet therapy pack Follow package directions       montelukast (SINGULAIR) 10 MG tablet Take 10 mg by mouth at bedtime.       MULTIPLE VITAMINS-CALCIUM PO Take 1 tablet by mouth every 24 hours       predniSONE (DELTASONE) 1 MG tablet Take 8 mg by mouth daily.       QNASL 80 MCG/ACT Nasal Spray USE 1 SPRAY IN EACH NOSTRIL TWICE DAILY 10.6 g 11     sulindac (CLINORIL) 200 MG tablet Take 200 mg by mouth 2 times daily.        Allergies   Allergen Reactions     Amlodipine Anaphylaxis     Ace Inhibitors Angioedema and Cough     Other Reaction(s): Cough, Not available     Food      PN: LW Other1: -Gluten Sensitivity     Gluten Meal Nausea and Vomiting     Lisinopril Angioedema     Intestinal angioedema      Losartan Nausea and GI Disturbance          Lab Results   Lab Results   Component Value Date     06/26/2025     01/02/2020    CO2 23 06/26/2025    CO2 28 11/03/2021    CO2 28 01/02/2020    BUN 17.6 06/26/2025    BUN 10 11/03/2021    BUN 15 01/02/2020     Lab Results   Component Value Date    WBC 9.0 07/22/2025    HGB 13.9 07/22/2025    HGB 13.4 08/13/2009    HCT 40.8 07/22/2025    MCV 91 07/22/2025     07/22/2025     Lab Results   Component Value Date    CHOL 170 08/13/2009    TRIG 148 08/13/2009    HDL 54 08/13/2009     Lab Results   Component Value Date    INR 0.92 04/28/2025  "    No results found for: \"BNP\"  No results found for: \"CKTOTAL\", \"CKMB\", \"TROPONINI\"  Lab Results   Component Value Date    TSH 2.33 06/14/2025    TSH 1.42 10/25/2021                    Thank you for allowing me to participate in the care of your patient.      Sincerely,     Reji George MD     [facility]  cc:   Baldemar Copeland MD  [address] [place] [id number]      "

## 2025-07-29 ENCOUNTER — ORDERS ONLY (AUTO-RELEASED) (OUTPATIENT)
Dept: CARDIOLOGY | Facility: CLINIC | Age: 45
End: 2025-07-29
Payer: COMMERCIAL

## 2025-07-29 DIAGNOSIS — R00.2 PALPITATIONS: ICD-10-CM

## 2025-08-29 ENCOUNTER — HOSPITAL ENCOUNTER (OUTPATIENT)
Dept: CARDIOLOGY | Facility: HOSPITAL | Age: 45
Discharge: HOME | End: 2025-08-29
Attending: INTERNAL MEDICINE | Admitting: INTERNAL MEDICINE
Payer: COMMERCIAL

## 2025-08-29 DIAGNOSIS — I10 HYPERTENSION, UNSPECIFIED TYPE: ICD-10-CM

## 2025-08-29 DIAGNOSIS — R00.2 PALPITATIONS: ICD-10-CM

## 2025-08-29 PROCEDURE — 255N000002 HC RX 255 OP 636: Performed by: INTERNAL MEDICINE

## 2025-08-29 PROCEDURE — 93306 TTE W/DOPPLER COMPLETE: CPT | Mod: 26 | Performed by: INTERNAL MEDICINE

## 2025-08-29 PROCEDURE — C8929 TTE W OR WO FOL WCON,DOPPLER: HCPCS

## 2025-08-29 RX ADMIN — PERFLUTREN 3 ML: 6.52 INJECTION, SUSPENSION INTRAVENOUS at 08:43
